# Patient Record
Sex: FEMALE | Race: WHITE | NOT HISPANIC OR LATINO | Employment: OTHER | URBAN - METROPOLITAN AREA
[De-identification: names, ages, dates, MRNs, and addresses within clinical notes are randomized per-mention and may not be internally consistent; named-entity substitution may affect disease eponyms.]

---

## 2023-08-22 ENCOUNTER — TELEPHONE (OUTPATIENT)
Dept: CARDIOLOGY | Facility: MEDICAL CENTER | Age: 63
End: 2023-08-22

## 2023-08-22 ENCOUNTER — TELEPHONE (OUTPATIENT)
Dept: CARDIOLOGY | Facility: MEDICAL CENTER | Age: 63
End: 2023-08-22
Payer: MEDICARE

## 2023-08-22 NOTE — TELEPHONE ENCOUNTER
Spoke to patient in regards to records for NP appointment with Dr. Holt.     Patient has seen a cardiologist before?  Yes   If yes, where?:  thinks if was a Dr Hernandez at the [facility] in [facility] [place]     Any recent cardiac testing outside of [facility]?  Yes Labs    What testing:   Where was it completed?: [facility] in [facility].    Were any records requested?  No   Fax:

## 2023-08-22 NOTE — TELEPHONE ENCOUNTER
Called patient in regards to records for NP appointment with Dr. BENITEZ To review most recent lab results, ekg, any cardiac testing or ,if she has been treated by a cardiologist. No answer, left voicemail to call back

## 2023-08-29 ENCOUNTER — PHARMACY VISIT (OUTPATIENT)
Dept: PHARMACY | Facility: MEDICAL CENTER | Age: 63
End: 2023-08-29
Payer: COMMERCIAL

## 2023-08-29 ENCOUNTER — DEVICE CHECK (OUTPATIENT)
Dept: CARDIOLOGY | Facility: MEDICAL CENTER | Age: 63
End: 2023-08-29
Payer: MEDICARE

## 2023-08-29 ENCOUNTER — OFFICE VISIT (OUTPATIENT)
Dept: CARDIOLOGY | Facility: MEDICAL CENTER | Age: 63
End: 2023-08-29
Attending: INTERNAL MEDICINE
Payer: MEDICARE

## 2023-08-29 VITALS
SYSTOLIC BLOOD PRESSURE: 130 MMHG | OXYGEN SATURATION: 95 % | RESPIRATION RATE: 16 BRPM | BODY MASS INDEX: 42.52 KG/M2 | HEIGHT: 63 IN | HEART RATE: 72 BPM | WEIGHT: 240 LBS | DIASTOLIC BLOOD PRESSURE: 86 MMHG

## 2023-08-29 DIAGNOSIS — E11.59 TYPE 2 DIABETES MELLITUS WITH OTHER CIRCULATORY COMPLICATION, WITHOUT LONG-TERM CURRENT USE OF INSULIN (HCC): ICD-10-CM

## 2023-08-29 DIAGNOSIS — E66.01 MORBID OBESITY (HCC): Chronic | ICD-10-CM

## 2023-08-29 DIAGNOSIS — I48.0 PAROXYSMAL A-FIB (HCC): ICD-10-CM

## 2023-08-29 DIAGNOSIS — G47.33 OSA ON CPAP: Chronic | ICD-10-CM

## 2023-08-29 DIAGNOSIS — I48.19 PERSISTENT ATRIAL FIBRILLATION (HCC): Chronic | ICD-10-CM

## 2023-08-29 PROBLEM — I48.11 LONGSTANDING PERSISTENT ATRIAL FIBRILLATION (HCC): Status: ACTIVE | Noted: 2022-12-09

## 2023-08-29 PROBLEM — Z86.718 HISTORY OF DVT (DEEP VEIN THROMBOSIS): Status: ACTIVE | Noted: 2022-12-09

## 2023-08-29 PROBLEM — F32.0 CURRENT MILD EPISODE OF MAJOR DEPRESSIVE DISORDER WITHOUT PRIOR EPISODE (HCC): Status: ACTIVE | Noted: 2022-12-09

## 2023-08-29 PROBLEM — Z95.0 PACEMAKER: Status: ACTIVE | Noted: 2022-12-09

## 2023-08-29 LAB — EKG IMPRESSION: NORMAL

## 2023-08-29 PROCEDURE — RXMED WILLOW AMBULATORY MEDICATION CHARGE: Performed by: INTERNAL MEDICINE

## 2023-08-29 PROCEDURE — 3075F SYST BP GE 130 - 139MM HG: CPT | Performed by: INTERNAL MEDICINE

## 2023-08-29 PROCEDURE — 99204 OFFICE O/P NEW MOD 45 MIN: CPT | Mod: 25 | Performed by: INTERNAL MEDICINE

## 2023-08-29 PROCEDURE — 3079F DIAST BP 80-89 MM HG: CPT | Performed by: INTERNAL MEDICINE

## 2023-08-29 PROCEDURE — 93005 ELECTROCARDIOGRAM TRACING: CPT | Performed by: INTERNAL MEDICINE

## 2023-08-29 PROCEDURE — 99211 OFF/OP EST MAY X REQ PHY/QHP: CPT | Performed by: INTERNAL MEDICINE

## 2023-08-29 PROCEDURE — 93010 ELECTROCARDIOGRAM REPORT: CPT | Performed by: INTERNAL MEDICINE

## 2023-08-29 RX ORDER — ATORVASTATIN CALCIUM 40 MG/1
TABLET, FILM COATED ORAL
COMMUNITY
Start: 2023-07-11 | End: 2024-01-23 | Stop reason: SDUPTHER

## 2023-08-29 RX ORDER — RIVAROXABAN 20 MG/1
TABLET, FILM COATED ORAL
COMMUNITY
Start: 2023-07-15 | End: 2023-08-29

## 2023-08-29 RX ORDER — EMPAGLIFLOZIN 10 MG/1
10 TABLET, FILM COATED ORAL DAILY
Qty: 90 TABLET | Refills: 3 | Status: SHIPPED | OUTPATIENT
Start: 2023-08-29 | End: 2023-10-23

## 2023-08-29 RX ORDER — DABIGATRAN ETEXILATE 150 MG/1
150 CAPSULE ORAL 2 TIMES DAILY
Qty: 180 CAPSULE | Refills: 3 | Status: SHIPPED | OUTPATIENT
Start: 2023-08-29 | End: 2023-10-17

## 2023-08-29 RX ORDER — GLIMEPIRIDE 2 MG/1
TABLET ORAL
COMMUNITY
Start: 2023-08-04 | End: 2023-11-08

## 2023-08-29 RX ORDER — DESVENLAFAXINE 25 MG/1
TABLET, EXTENDED RELEASE ORAL
COMMUNITY
End: 2023-10-23

## 2023-08-29 ASSESSMENT — FIBROSIS 4 INDEX: FIB4 SCORE: 1.59

## 2023-08-29 NOTE — PATIENT INSTRUCTIONS
You can see me and do pacer checks in [facility] or [place] can also do pacer checks in [place]    Look into [facility]    Pacemaker direct [phone]    [facility] for sleep apnea    I am a [facility] Cardiologist providing cardiology care with [facility] at:    [facility] for those over 65 ([address], [place])     For clinic appointment scheduling, please contact [facility] at  [phone] ([facility]).    For all clinical questions related to your heart care including medications, always contact me at [facility] Cardiology Office in [place] by sending a MyChart or calling [phone].    We have Pacer check in person available in [facility] for those with [employer] or [employer] Pacemakers. Depending on insurance, you can also do in-person at [facility] ([address], [phone]) on Wednesdays with CHARLOTTE Benedict.     Most testing (Labs, Echo, Nuclear Stress Test, Vascular Screening) can be done at [facility] please call [phone] to schedule.    For Cardiac PET, Stress Echo, Cardiac MRI, Cardiac CTA these are only done in [place] please call [facility] Imaging [phone] to schedule.    If you need a procedure such as angiogram, heart stent, pacemaker or ablation my office will contact you for scheduling.      If you need to see me more than once a year, or see us urgently in person, ARMAND Portillo can see you as well.  There are two other Cardiologists who work with me here at [facility] (Dr Shemar Cruz and Dr Mg Jaffe)    [employer] is the easiest way to communicate with my office on your smartphone or computer via the internet.    [facility] Customer Support for [employer], call [phone].     We discussed that you should talk with primary care (or endocrinology) about oral medication: empagliflozin (JARDIANCE®  Preferred for CAD), dapagliflozin (FARXIGA®, preferred for CKD),  there are cardiovascular benefits but  there are also risks.  You may also want to consider liraglutide (Victoza®), semaglutide (Ozempic®), dulaglutide (Trulicity®) which are injection with cardiovascular benefits but also risks.    We discussed adding Jardiance (empagliflozin) 10 mg daily to your regimen to prevent complications from heart disease and help with blood sugar.  Based on large clinical trials Jardiance is expected to reduce your risk by 38% of heart attack or dying from heart disease if you have coronary disease and diabetes (EMPA-REG Study).  In patients without diabetes but who have heart failure, there is a 21-25% relatively less likelihood of dying or being hospitalized for heart failure (EMPEROR Study).  While Jardiance has benefits (most importantly reduce risk for hospitalization for heart failure or heart attack, weight loss, blood pressure control and reduces death) it also has risks due to the fact that you will be urinating excess sugar out of your body. There is increased risk for urinary and yeast infection, which can be very serious, so please report any symptoms as soon as possible to urgent care, primary care or our office.  You should stay well-hydrated on this medication and report abdominal pains.  You are expected to lose weight over time with this medication and may need to reduce your other medications.  It is advised to check your chemistries after starting this medication within the month and then regularly as is typically required for your other medications.

## 2023-08-29 NOTE — PROGRESS NOTES
Device is functioning appropriately.  No changes made today.  Request for transfer of home monitor.  Patient to return in 3 months as device is nearing ROBINA.

## 2023-08-30 ASSESSMENT — ENCOUNTER SYMPTOMS
DIZZINESS: 0
PND: 0
ABDOMINAL PAIN: 0
FOCAL WEAKNESS: 0
MEMORY LOSS: 1
BLURRED VISION: 0
PALPITATIONS: 0
FEVER: 0
FALLS: 1
CLAUDICATION: 0
SORE THROAT: 0
DEPRESSION: 1
COUGH: 0
WEAKNESS: 0
CHILLS: 0
NAUSEA: 0
SHORTNESS OF BREATH: 1
BRUISES/BLEEDS EASILY: 1

## 2023-08-30 NOTE — PROGRESS NOTES
Chief Complaint   Patient presents with    Atrial Fibrillation     NP        Subjective     Bruna Germán Nair is a 62 y.o. female who presents today to establish care for history of A-fib ablations and BiV pacemaker she describes herself as being pacer dependent her records are from [place] she lives in [place] and like to get established at [facility] she will be going on Medicare    She has been doing well tolerating her medications well her Eliquis is expensive    She has new diabetes    She has sleep apnea treated with CPAP    Past Medical History:   Diagnosis Date    EZEQUIEL on CPAP     Persistent atrial fibrillation (HCC) - s/p ablations      Past Surgical History:   Procedure Laterality Date    OTHER      Hysterectomy, appendectomy    IN REPLACE/ REPAIR COMPONENT ARTIFICAL HEART, E*       Family History   Problem Relation Age of Onset    Arrythmia Neg Hx     Heart Disease Neg Hx      Social History     Socioeconomic History    Marital status:      Spouse name: Not on file    Number of children: Not on file    Years of education: Not on file    Highest education level: Not on file   Occupational History    Not on file   Tobacco Use    Smoking status: Never    Smokeless tobacco: Never   Substance and Sexual Activity    Alcohol use: Not on file    Drug use: Not on file    Sexual activity: Not on file   Other Topics Concern    Not on file   Social History Narrative    Not on file     Social Determinants of Health     Financial Resource Strain: Not on file   Food Insecurity: Not on file   Transportation Needs: Not on file   Physical Activity: Not on file   Stress: Not on file   Social Connections: Not on file   Intimate Partner Violence: Not on file   Housing Stability: Not on file     Allergies   Allergen Reactions    Metformin      Severe nausea    Sulfa Drugs Itching    Hydrocodone Nausea     Outpatient Encounter Medications as of 8/29/2023   Medication Sig Dispense Refill    atorvastatin (LIPITOR) 40 MG  "Tab       glimepiride (AMARYL) 2 MG Tab       Desvenlafaxine Succinate ER 25 MG TABLET SR 24 HR Take  by mouth.      Empagliflozin (JARDIANCE) 10 MG Tab tablet Take 1 Tablet by mouth every day. 90 Tablet 3    dabigatran (PRADAXA) 150 MG Cap capsule Take 1 Capsule by mouth 2 times a day. 180 Capsule 3    [DISCONTINUED] XARELTO 20 MG Tab tablet        No facility-administered encounter medications on file as of 8/29/2023.     Review of Systems   Constitutional:  Negative for chills and fever.   HENT:  Negative for sore throat.    Eyes:  Negative for blurred vision.   Respiratory:  Positive for shortness of breath. Negative for cough.    Cardiovascular:  Negative for chest pain, palpitations, claudication, leg swelling and PND.   Gastrointestinal:  Negative for abdominal pain and nausea.   Genitourinary:  Positive for frequency.   Musculoskeletal:  Positive for falls. Negative for joint pain.   Skin:  Negative for rash.   Neurological:  Negative for dizziness, focal weakness and weakness.   Endo/Heme/Allergies:  Positive for environmental allergies. Bruises/bleeds easily.   Psychiatric/Behavioral:  Positive for depression and memory loss.               Objective     /86 (BP Location: Left arm, Patient Position: Sitting, BP Cuff Size: Adult)   Pulse 72   Resp 16   Ht 1.6 m (5' 3\")   Wt 109 kg (240 lb)   SpO2 95%   BMI 42.51 kg/m²     Physical Exam  Constitutional:       General: She is not in acute distress.     Appearance: She is not diaphoretic.   Eyes:      General: No scleral icterus.  Neck:      Vascular: No JVD.   Cardiovascular:      Rate and Rhythm: Normal rate.      Heart sounds: Normal heart sounds. No murmur heard.     No friction rub. No gallop.   Pulmonary:      Effort: No respiratory distress.      Breath sounds: No wheezing or rales.   Abdominal:      General: Bowel sounds are normal.      Palpations: Abdomen is soft.   Musculoskeletal:      Right lower leg: No edema.      Left lower leg: No " edema.   Skin:     Findings: No rash.   Neurological:      Mental Status: She is alert. Mental status is at baseline.   Psychiatric:         Mood and Affect: Mood normal.            We reviewed in person the most recent labs  Recent Results (from the past 5040 hour(s))   POCT A1C    Collection Time: 07/11/23  1:48 PM   Result Value Ref Range    Glycohemoglobin 8.8 (A) 4.2 - 5.8 %    Est Avg Glucose      Glycohemoglobin     MICROALBUMIN CREAT RATIO URINE    Collection Time: 07/19/23  8:44 AM   Result Value Ref Range    Microalbumin Urine Timed <9.2 0.0 - 30.0 mg/g    Microalbumin, Urine Random <0.7 <=1.9 mg/dL    Creatinine, Random Urine 76 Not Established mg/dL   COMP METABOLIC PANEL    Collection Time: 07/19/23  8:44 AM   Result Value Ref Range    Sodium 138 136 - 144 mmol/L    Potassium 4.5 3.6 - 5.1 mmol/L    Chloride 106 102 - 110 mmol/L    Co2 26 22 - 32 mmol/L    Alkaline Phosphatase 70 38 - 126 U/L    AST(SGOT) 76 (H) 15 - 41 U/L    ALT(SGPT) 112 (H) 14 - 54 U/L    Bun 20 8 - 20 mg/dL    Creatinine 0.75 0.60 - 1.10 mg/dL    Calcium 9.2 8.7 - 10.3 mg/dL    Total Protein 7.4 6.4 - 8.2 g/dL    Albumin 4.2 3.5 - 4.8 g/dL    Ag Ratio 1.3 1.0 - 2.2 ratio    Anion Gap 6 2 - 11 mmol/L    Glom Filt Rate, Est 78 >60 mL/min/1.7    Globulin 3.2 (H) 2.6 - 3.1 g/dL    Glucose 174 (H) 60 - 99 mg/dL    Total Bilirubin 0.7 0.4 - 2.0 mg/dL   HEPATITIS PANEL ACUTE (4 COMPONENTS)    Collection Time: 07/19/23  8:44 AM   Result Value Ref Range    Hep A Virus Antibody Total Reactive (A) Nonreactive    Hepatitis A Virus Ab, IgM Nonreactive Nonreactive    Hepatitis B Core Ab, Total Nonreactive Nonreactive    Hepatitis B Cors Ab,IgM Nonreactive Nonreactive    Hep B Surgace Ab, Qual Nonreactive     Hepatitis B Surface Antigen Nonreactive Nonreactive    Hepatitis C virus IgG Ab (Units/volume) in Serum by Immunoassay External Nonreactive Nonreactive   EKG    Collection Time: 08/29/23 10:58 AM   Result Value Ref Range    Report        [facility] [place] [facility]    Test Date:  2023  Pt Name:    TRINY NAIR                  Department: [facility]  MRN:        [id number]                      Room:  Gender:     Female                       Technician: OKSANA  :        1960                   Requested By:PIERCE FABIAN  Order #:    [id number]                    Reading MD: Pierce Fabian MD    Measurements  Intervals                                Axis  Rate:       80                           P:          50  NY:         162                          QRS:        -72  QRSD:       129                          T:          56  QT:         411  QTc:        475    Interpretive Statements  Atrial-BIventricular dual-paced rhythm  No previous ECG available for comparison  Electronically Signed On 2023 17:32:18 PDT by Pierce Fabian MD           Assessment & Plan     1. Paroxysmal A-fib (HCC)  EKG      2. Morbid obesity (HCC)        3. EZEQUIEL on CPAP  Referral to Pulmonary and Sleep Medicine      4. Persistent atrial fibrillation (HCC) - s/p ablations  dabigatran (PRADAXA) 150 MG Cap capsule    EC-ECHOCARDIOGRAM COMPLETE W/O CONT      5. Type 2 diabetes mellitus with other circulatory complication, without long-term current use of insulin (HCC)  Empagliflozin (JARDIANCE) 10 MG Tab tablet          Medical Decision Making: Today's Assessment/Status/Plan:        It was my pleasure to meet with Ms. Nair.    We addressed the management of hypertension at today's visit. Blood pressure is well controlled.  We specifically assessed the labs on hypertension treatment    We addressed the management of dyslipidemia and atherosclerosis at today's visit. She is on appropriate statin.    We addressed the management of atrial fibrillation.  She is on proper anticoagulation cholesterol management and rate or rhythm control as appropriate.  We addressed the potential side effects and laboratory follow-up for these medications.  We will switch her to  Pradaxa for cost savings    We addressed the management of chronic anticoagulation at today's visit. She understands the risks and benefits of chronic anticoagulation.  We reviewed and verified the results of annual testing for anemia and kidney function.    For diabetes she would benefit from Jardiance    I personally reviewed in person with the patient her most recent pacemaker check it is functioning appropriately.     8/29/2023   [facility] DEVICE FLOWSHEET    Device History: Third Degree AV Block;Congestive Heart Failure    Device Type: C R T - P    Mode: D D D R    Rate: 60    Presenting Rhythm: ApVp    Atrially Paced: (%) 33 %    Ventricularly Paced: (%) 100 %    Device Therapy Details: No therapies--2 NST episodes    Mode Switching Details: Mode switch @ 0.2%--possible TENZIN noted on some EGM    Atrial Lead Threshold: (Volts) 0.75 Volts    Atrial Lead Sensing: (mV) 4.4 mV    Atrial Lead Impedance: (Ohms) 399 Ohms    Right Ventricular Lead Threshold: (Volts) 1.75 Volts    Right Ventricular Lead Impedance: (Ohms) 475 Ohms    Left Ventricular Lead Threshold: (Volts) 1 Volts    Left Ventricular Lead Impedance: (Ohms) 608 Ohms    Battery Estimated Longevity: 10-19 Months    Changes Made: No changes made          I will see Ms. Nair back in 1 year time and encouraged her to follow up with us over the phone or electronically using my MyChart as issues arise.    It is my pleasure to participate in the care of Ms. Nair.  Please do not hesitate to contact me with questions or concerns.    Pierce Holt MD PhD [facility]  Cardiologist [facility]    Please note that this dictation was created using voice recognition software. There may be errors I did not discover before finalizing the note.

## 2023-10-06 ENCOUNTER — TELEPHONE (OUTPATIENT)
Dept: PHARMACY | Facility: MEDICAL CENTER | Age: 63
End: 2023-10-06
Payer: MEDICARE

## 2023-10-11 ENCOUNTER — TELEPHONE (OUTPATIENT)
Dept: CARDIOLOGY | Facility: MEDICAL CENTER | Age: 63
End: 2023-10-11
Payer: MEDICARE

## 2023-10-11 NOTE — TELEPHONE ENCOUNTER
Last OV: 8/29/2023  Proposed Surgery: Colonoscopy with Deep (Propofol) Sedation  Surgery Date: 10/18/2023  Requesting Office Name: [facility]  Fax Number: [phone]  Preference of Location (default is surgery center unless specified by Cardiologist or [facility])  Prior Clearance Addressed: No      Anticoags/Antiplatelets: Pradaxa  Outstanding Cardiac Imaging : Yes  Echo.   Clearance to provider to review  Stent, Cardiac Devices, or Catheterization: Yes  Date : 5/16/2017   Ablation, TAVR/Valve (including open heart), Cardioversion: No  Recent Cardiac Hospitalization: No            When: N/A  History (cardiac history):   Past Medical History:   Diagnosis Date    EZEQUIEL on CPAP     Persistent atrial fibrillation (HCC) - s/p ablations              Surgical Clearance Letter Sent: YES   **Scan clearance request letter into [facility].**

## 2023-10-11 NOTE — LETTER
PROCEDURE/SURGERY CLEARANCE FORM      Encounter Date: 10/11/2023    Patient: Bruna Nair  YOB: 1960    CARDIOLOGIST:  Pierce Holt M.D.    REFERRING DOCTOR:  No ref. provider found    The above patient is cleared to have the following procedure/surgery: Colonoscopy with Deep (Propofol) Sedation  PROCEDURE/SURGERY CLEARANCE FORM    Date: 10/11/2023   Patient Name: Bruna Nair    Dear Surgeon or Proceduralist,      Thank you for your request for cardiac stratification of our mutual patient Bruna Nair 1960. We have reviewed their [facility] records; and to the best of our understanding this patient has not had stenting, ablation, cardiothoracic surgery or hospitalization for cardiovascular reasons in the past 6 months.  Bruna Nair has been seen within the past 18 months and is considered to have non-modifiable cardiac risk for this low-risk procedure/surgery. They may proceed from a cardiovascular standpoint and may hold their antiplatelet/anticoagulation as briefly as possible. Please have patient resume this medication when hemodynamically stable to do so.     Aspirin or Prasugrel   - hold 7 days prior to procedure/surgery, resume when hemodynamically stable      Clopidrogrel or Ticagrelor  - hold 7 days for all neurological procedures, hold 5 days prior to all other procedure/surgery,  resume when hemodynamically stable     Warfarin - hold 7 days for all neurological procedures, hold 5 days prior to all other procedure/surgery and coordinate with [facility] ([phone]) INR testing and dose management.      Pradaxa/Xarelto/Eliquis/Savesya - hold 1 day prior to procedure for low bleeding risk procedure, 2 days for high bleeding risk procedure, or consider holding 3 days or longer for patients with reduced kidney function (CrCl <30mL/min) or spinal/cranial surgeries/procedures.      If they have a mechanical heart valve, please coordinate with  [facility] Anticoagulation [facility] ([phone]) the proper management of their anticoagulant in the periprocedural or perioperative period.      Some patients have higher risk for cardiovascular complications or holding medication. If our patient has had prior complications of holding antiplatelet or anticoagulants in the past and we have seen them after these events, we have addressed these concerns with the patient. They are at an unknown degree of increased risk for recurrent complication.  You may hold anticoagulation/antiplatelets for the procedure or surgery if the benefits of the procedure or surgery outweigh this nonmodifiable risk.      If Bruna Nair 1960 has new symptoms of heart failure decompensation, unstable arrythmia, or angina please reach out and we will assess the patient.      If you have other patient-specific concerns, please feel free to reach out to the patient's cardiologist directly at [phone].     Thank you,       [facility]     Electronically Signed        MD Fatimah Holt M.D.

## 2023-10-16 ENCOUNTER — TELEPHONE (OUTPATIENT)
Dept: CARDIOLOGY | Facility: MEDICAL CENTER | Age: 63
End: 2023-10-16
Payer: MEDICARE

## 2023-10-16 DIAGNOSIS — I48.0 PAROXYSMAL A-FIB (HCC): ICD-10-CM

## 2023-10-16 NOTE — TELEPHONE ENCOUNTER
Phone number called: [phone]    Call outcome: Spoke to patient and she stated she had the episode of GI bleeding as described below, and she reached out to her old primary care physician (her new one is on leave) and they switched her back to Xarelto from Pradaxa. Patient would like to stay on Xarelto at this time. She hasn't had any reoccurrence of bleeding since the initial one. Advised patient to reach out to her GI physician as well, as she is in process of scheduling a colonoscopy.     To CW: Patient was switched back to Xarelto by PCP after GI bleeding episode. Patient requesting to stay on Xarelto rather than Pradaxa, and is asking for a new prescription be sent. Please advise if okay to send prescription for prior Xarelto dose or if other recommendations. Thank you!

## 2023-10-16 NOTE — TELEPHONE ENCOUNTER
----- Message from Sydni Mirza R.N. sent at 10/16/2023  9:20 AM PDT -----  Regarding: FW: Pradaxa    ----- Message -----  From: Lorie Anderson  Sent: 10/13/2023  12:37 PM PDT  To: Shalini Guerra R.N.  Subject: Pradaxa                                          Cameron Loya,     This patient had a rectum bleeding episode today and is requesting to talk with you (or Dr. Holt) regarding her new start of Pradaxa. She was taking Xarelto for years, however, she was changed to Pradaxa and started taking it 2 weeks ago. She had a bowel movement yesterday (10/12) with no bleeding, however this morning she had rectum bleeding for about 10 minutes without a bowel movement. She stated that it was not clumps, just red blood. She reached out to her old primary care physician when she could not get ahold of Dr. Holt and he wrote her a new Rx for Xarelto because she is afraid that it is from the Pradaxa but does not want to go without a blood thinner all weekend. She would like to talk with you in regards to the bleeding and how to proceed from here.... Also if there is any process of transitioning back to Xarelto from Pradaxa if needed (like time in between tabs ect).     Can you please reach out to patient ASAP?    Thank you very much!    Lorie DENNIS  Rx Coordinator

## 2023-10-17 NOTE — TELEPHONE ENCOUNTER
Phone number called: [phone]    Call outcome: Spoke with patient and provided CW's recommendations. Patient would like to stay on Xarelto since she has tolerated it a long time prior to trying to switch. She had already restarted the Xarelto and discontinued the Pradaxa after speaking with her old PCP. Medication list updated and refill sent to her preferred pharmacy. Patient states she is following up with her GI doctor as well. All questions/concerns answered at this time, patient appreciative of information given.

## 2023-10-17 NOTE — TELEPHONE ENCOUNTER
Unfortunately GI bleeding can be a higher risk with Pradaxa.  The preferred blood thinner will be Eliquis 5 mg twice a day but Xarelto is a very reasonable option.  Certainly monitor for blood and see her gastroenterologist for standard screening if she has not done that recently.  She just has to stop Pradaxa what day and start Xarelto (or Eliquis) the next day.

## 2023-10-19 ENCOUNTER — TELEPHONE (OUTPATIENT)
Dept: PHARMACY | Facility: MEDICAL CENTER | Age: 63
End: 2023-10-19
Payer: MEDICARE

## 2023-10-19 NOTE — TELEPHONE ENCOUNTER
Medication: Jardiance 10mg tabs  Type of Insurance: Government funded (Medicare/Medicare Advantage)  Type of Financial assistance requested MAP/Free Drug  Source: [facility]  Source Phone #: [phone]  Outcome: Approved  Effective dates: 10/13/2023 until 12/31/2024    Final Copay: $0    Spoke with Deana ([facility] rep) on 10/19/23 to check status on application. She confirmed application was approved and shipment is ready to be sent out today, 10/19/23, and will take 7-10 business days to arrive to patient. Deana stated that each time the patient receives their medication, there will be a green card in the box for the patient to fill out and send back to [facility] to initiate the auto fill for their next refill. Calling patient to advise of Jardiance PAP approval.

## 2023-10-20 ENCOUNTER — TELEPHONE (OUTPATIENT)
Dept: CARDIOLOGY | Facility: MEDICAL CENTER | Age: 63
End: 2023-10-20
Payer: MEDICARE

## 2023-10-20 DIAGNOSIS — R06.02 SHORTNESS OF BREATH: ICD-10-CM

## 2023-10-20 DIAGNOSIS — I48.0 PAROXYSMAL A-FIB (HCC): ICD-10-CM

## 2023-10-20 DIAGNOSIS — Z79.899 ON POTASSIUM WASTING DIURETIC THERAPY: ICD-10-CM

## 2023-10-20 RX ORDER — FUROSEMIDE 20 MG/1
20 TABLET ORAL 2 TIMES DAILY
Qty: 8 TABLET | Refills: 0 | Status: SHIPPED | OUTPATIENT
Start: 2023-10-20 | End: 2023-10-23 | Stop reason: SDUPTHER

## 2023-10-20 RX ORDER — POTASSIUM CHLORIDE 20 MEQ/1
20 TABLET, EXTENDED RELEASE ORAL 2 TIMES DAILY
Qty: 8 TABLET | Refills: 0 | Status: SHIPPED | OUTPATIENT
Start: 2023-10-20 | End: 2023-10-23 | Stop reason: SDUPTHER

## 2023-10-20 NOTE — TELEPHONE ENCOUNTER
CW    Caller: Bruna Nair     Topic/issue: Pt would like an ECHO order placed at the [facility], please advise.     Callback Number: [phone] (home)       Thank you,     Robert CHRISTIANSEN

## 2023-10-20 NOTE — TELEPHONE ENCOUNTER
CW    Caller: Bruna Nair    Topic/issue: Patient calling and states that she was seen 10-19-23 at [facility] with symptoms of being short of breath. Told by ER doctor she is in AFIB and given lasix due to fluid around chest. I was able to get patient an appointment on 10/24/23 but she is concerned about running out of medications on  and asking for refill for the followin) Lasix 20 mg taking one twice a day  2) Potasium 20 mg taking one tab by mouth twice a day    Pharmacy: [facility] in [place] on file.    Callback Number: [phone] (home)      Thank you,  Allyn GOMEZ

## 2023-10-23 ENCOUNTER — OFFICE VISIT (OUTPATIENT)
Dept: CARDIOLOGY | Facility: MEDICAL CENTER | Age: 63
End: 2023-10-23
Attending: NURSE PRACTITIONER
Payer: MEDICARE

## 2023-10-23 VITALS
RESPIRATION RATE: 16 BRPM | BODY MASS INDEX: 41.46 KG/M2 | DIASTOLIC BLOOD PRESSURE: 72 MMHG | WEIGHT: 234 LBS | HEART RATE: 80 BPM | HEIGHT: 63 IN | SYSTOLIC BLOOD PRESSURE: 110 MMHG | OXYGEN SATURATION: 94 %

## 2023-10-23 VITALS
OXYGEN SATURATION: 94 % | HEIGHT: 63 IN | SYSTOLIC BLOOD PRESSURE: 110 MMHG | HEART RATE: 80 BPM | BODY MASS INDEX: 41.46 KG/M2 | RESPIRATION RATE: 16 BRPM | WEIGHT: 234 LBS | DIASTOLIC BLOOD PRESSURE: 72 MMHG

## 2023-10-23 DIAGNOSIS — R06.02 SHORTNESS OF BREATH: ICD-10-CM

## 2023-10-23 DIAGNOSIS — Z79.899 ON POTASSIUM WASTING DIURETIC THERAPY: ICD-10-CM

## 2023-10-23 DIAGNOSIS — D68.318 CIRCULATING ANTICOAGULANTS (HCC): ICD-10-CM

## 2023-10-23 DIAGNOSIS — I48.0 PAROXYSMAL A-FIB (HCC): ICD-10-CM

## 2023-10-23 DIAGNOSIS — I48.19 PERSISTENT ATRIAL FIBRILLATION (HCC): Chronic | ICD-10-CM

## 2023-10-23 DIAGNOSIS — E11.9 TYPE 2 DIABETES MELLITUS WITHOUT COMPLICATION, WITHOUT LONG-TERM CURRENT USE OF INSULIN (HCC): ICD-10-CM

## 2023-10-23 DIAGNOSIS — I10 ESSENTIAL HYPERTENSION, BENIGN: ICD-10-CM

## 2023-10-23 DIAGNOSIS — I44.2 AV BLOCK, COMPLETE (HCC): ICD-10-CM

## 2023-10-23 DIAGNOSIS — Z95.0 PACEMAKER: ICD-10-CM

## 2023-10-23 PROCEDURE — 3078F DIAST BP <80 MM HG: CPT | Performed by: NURSE PRACTITIONER

## 2023-10-23 PROCEDURE — 93288 INTERROG EVL PM/LDLS PM IP: CPT | Performed by: NURSE PRACTITIONER

## 2023-10-23 PROCEDURE — 99214 OFFICE O/P EST MOD 30 MIN: CPT | Performed by: NURSE PRACTITIONER

## 2023-10-23 PROCEDURE — 93288 INTERROG EVL PM/LDLS PM IP: CPT | Mod: 26 | Performed by: NURSE PRACTITIONER

## 2023-10-23 PROCEDURE — 3074F SYST BP LT 130 MM HG: CPT | Performed by: NURSE PRACTITIONER

## 2023-10-23 PROCEDURE — 99212 OFFICE O/P EST SF 10 MIN: CPT | Performed by: NURSE PRACTITIONER

## 2023-10-23 RX ORDER — POTASSIUM CHLORIDE 20 MEQ/1
20 TABLET, EXTENDED RELEASE ORAL 2 TIMES DAILY
Qty: 60 TABLET | Refills: 6 | Status: SHIPPED | OUTPATIENT
Start: 2023-10-23

## 2023-10-23 RX ORDER — VITAMIN B COMPLEX
1000 TABLET ORAL DAILY
COMMUNITY

## 2023-10-23 RX ORDER — FUROSEMIDE 20 MG/1
20 TABLET ORAL 2 TIMES DAILY
Qty: 60 TABLET | Refills: 6 | Status: SHIPPED | OUTPATIENT
Start: 2023-10-23 | End: 2023-11-08

## 2023-10-23 ASSESSMENT — ENCOUNTER SYMPTOMS
SHORTNESS OF BREATH: 1
FEVER: 0
BRUISES/BLEEDS EASILY: 0
NERVOUS/ANXIOUS: 1
LOSS OF CONSCIOUSNESS: 0
DIZZINESS: 0
COUGH: 0
HEADACHES: 0
ABDOMINAL PAIN: 0
NAUSEA: 0
ORTHOPNEA: 0
PND: 0
MYALGIAS: 0
CHILLS: 0
INSOMNIA: 0
PALPITATIONS: 0

## 2023-10-23 ASSESSMENT — FIBROSIS 4 INDEX
FIB4 SCORE: 1.18
FIB4 SCORE: 1.18

## 2023-10-23 NOTE — PROGRESS NOTES
Chief Complaint   Patient presents with    Hospital Follow-up     ER visit x 2 for shortness of breath    Shortness of Breath           Biventricular PM    Atrial Fibrillation    Anticoagulation       Subjective     Bruna Nair is a 63 y.o. female who presents today for ER follow-up of shortness of breath.    Bruna is a 63 year old female with history of high AV block with PM since 2007, upgraded to CRT-P in 2017, PAFib, status post previous ablation(s), anticoagulation with Xarelto, HTN, hyperlipidemia, DM and EZEQUIEL (on CPAP), first seen by Dr. Holt in August 2023, to establish care, as she was new to the area.    Last week, she presented to [facility] ER on 10/19/2023 with shortness of breath. CXR was normal, and BNP, D-dimer and troponins were all normal. She was treated with IV Lasix; she had minimal response.    Three days later, on 10/22/2023, she presented back at [facility] ER with shortness of breath; all labs and CXR came back normal. PM interrogation was also normal. She was sent him on Lasix 20mg BID, and KCL 20mEq BID.    She is here today for follow-up. Mostly, she is tired, and scared, as she still feels short of breath. She tried taking Lasix 40mg later in the afternoon (versus 20mg BID), and she did sleep better.    She denies any chest pain, pressure, tightness or discomfort; no symptomatic palpitations; some mild orthopnea but no PND; no dizziness or syncope; really minimal LE edema. BP has been stable.     She see pulmonology tomorrow (Dr. Drummond) to titrate her CPAP; it has not been adjusted in 5+ years.    Past Medical History:   Diagnosis Date    Anticoagulated     AV block, complete (HCC)     Diabetes mellitus (HCC)     Hyperlipidemia     Hypertension     EZEQUIEL on CPAP     Persistent atrial fibrillation (HCC) - s/p ablations      Past Surgical History:   Procedure Laterality Date    PACEMAKER INSERTION Left 05/16/2017    Upgrade to Medtronic Viva CRT-P C6TR01; original PM implanted in 2007.     APPENDECTOMY      HYSTERECTOMY, TOTAL ABDOMINAL       Family History   Problem Relation Age of Onset    Arrythmia Neg Hx     Heart Disease Neg Hx      Social History     Socioeconomic History    Marital status:      Spouse name: Not on file    Number of children: Not on file    Years of education: Not on file    Highest education level: Not on file   Occupational History    Not on file   Tobacco Use    Smoking status: Never    Smokeless tobacco: Never   Vaping Use    Vaping Use: Not on file   Substance and Sexual Activity    Alcohol use: Not Currently    Drug use: Not on file    Sexual activity: Not on file   Other Topics Concern    Not on file   Social History Narrative    Not on file     Social Determinants of Health     Financial Resource Strain: Not on file   Food Insecurity: Not on file   Transportation Needs: Not on file   Physical Activity: Not on file   Stress: Not on file   Social Connections: Not on file   Intimate Partner Violence: Not on file   Housing Stability: Not on file     Allergies   Allergen Reactions    Metformin      Severe nausea    Sulfa Drugs Itching    Hydrocodone Nausea     Outpatient Encounter Medications as of 10/23/2023   Medication Sig Dispense Refill    vitamin D3 (CHOLECALCIFEROL) 1000 Unit (25 mcg) Tab Take 1,000 Units by mouth every day.      Prenatal Vit-DSS-Fe Cbn-FA (PRENATAL AD PO) Take  by mouth.      furosemide (LASIX) 20 MG Tab Take 1 Tablet by mouth 2 times a day. 60 Tablet 6    potassium chloride SA (KDUR) 20 MEQ Tab CR Take 1 Tablet by mouth 2 times a day. 60 Tablet 6    rivaroxaban (XARELTO) 20 MG Tab tablet Take 1 Tablet by mouth with dinner. 90 Tablet 3    atorvastatin (LIPITOR) 40 MG Tab       glimepiride (AMARYL) 2 MG Tab       [DISCONTINUED] furosemide (LASIX) 20 MG Tab Take 1 Tablet by mouth 2 times a day. Must keep follow up visit on 10/24/23 to ensure further refills. 8 Tablet 0    [DISCONTINUED] potassium chloride SA (KDUR) 20 MEQ Tab CR Take 1 Tablet by  "mouth 2 times a day. Must keep follow up visit on 10/24/23 to ensure further refills. 8 Tablet 0    [DISCONTINUED] Desvenlafaxine Succinate ER 25 MG TABLET SR 24 HR Take  by mouth. (Patient not taking: Reported on 10/23/2023)      [DISCONTINUED] Empagliflozin (JARDIANCE) 10 MG Tab tablet Take 1 Tablet by mouth every day. (Patient not taking: Reported on 10/23/2023) 90 Tablet 3     No facility-administered encounter medications on file as of 10/23/2023.     Review of Systems   Constitutional:  Positive for malaise/fatigue. Negative for chills and fever.   HENT:  Negative for congestion.    Respiratory:  Positive for shortness of breath. Negative for cough.    Cardiovascular:  Negative for chest pain, palpitations, orthopnea, leg swelling and PND.   Gastrointestinal:  Negative for abdominal pain and nausea.   Musculoskeletal:  Negative for myalgias.   Skin:  Negative for rash.   Neurological:  Negative for dizziness, loss of consciousness and headaches.   Endo/Heme/Allergies:  Does not bruise/bleed easily.   Psychiatric/Behavioral:  The patient is nervous/anxious. The patient does not have insomnia.               Objective     /72 (BP Location: Left arm, Patient Position: Sitting, BP Cuff Size: Adult)   Pulse 80   Resp 16   Ht 1.6 m (5' 3\")   Wt 106 kg (234 lb)   SpO2 94%   BMI 41.45 kg/m²     Physical Exam  Constitutional:       Appearance: She is well-developed.      Comments: BMI 41.45   HENT:      Head: Normocephalic.   Neck:      Vascular: No JVD.   Cardiovascular:      Rate and Rhythm: Normal rate and regular rhythm.      Heart sounds: Normal heart sounds.      Comments: PM in left chest wall.  Pulmonary:      Effort: Pulmonary effort is normal. No respiratory distress.      Breath sounds: Normal breath sounds. No wheezing or rales.   Abdominal:      General: Bowel sounds are normal. There is no distension.      Palpations: Abdomen is soft.      Tenderness: There is no abdominal tenderness. "   Musculoskeletal:         General: Normal range of motion.      Cervical back: Normal range of motion and neck supple.   Skin:     General: Skin is warm and dry.      Findings: No rash.   Neurological:      Mental Status: She is alert and oriented to person, place, and time.   Psychiatric:         Mood and Affect: Mood normal.         Behavior: Behavior normal.     PM interrogation today reveals normal function. NO mode switching episodes in the last 24 hours. No changes are made today.    Impression of CXR of 10/22/2023 ([facility]):  1.  No acute cardiopulmonary finding.     Impression of CXR of 10/19/2023 ([facility]):  1.  No acute cardiopulmonary finding.     B-Type Natriuretic Peptide   <=100 pg/mL 10     D-DIMER   <0.50 ug/mL <0.27      Troponin-I, HS Baseline   <14 ng/L 5      Component      Latest Ref Rng 10/22/2023   Leukocytes, Absolute      3.7 - 10.6 x10E3/uL 8.5 (E)   RBC      4.19 - 5.21 x10e6/uL 5.10 (E)   Hemoglobin      12.3 - 16.0 g/dL 15.0 (E)   Hematocrit      36.0 - 47.0 % 44.2 (E)   MCV      81.0 - 99.0 fL 86.5 (E)   MCH      28.0 - 33.8 pg 29.5 (E)   MCHC      33.1 - 36.5 g/dL 34.0 (E)   RDW      11.8 - 14.0 % 13.1 (E)   Platelet Count      146 - 390 x10E3/uL 279 (E)   MPV      6.4 - 10.2 fL 8.8 (E)   Neutrophils-Polys      41.7 - 82.3 % 63.9 (E)   Lymphocytes      10.8 - 44.4 % 24.5 (E)   Monocytes      5.0 - 12.8 % 7.8 (E)   Eosinophils      0.0 - 6.6 % 2.6 (E)   Basophils      0.0 - 1.3 % 1.2 (E)   Neutrophils (Absolute)      1.40 - 8.00 x10E3/uL 5.40 (E)   Lymphs (Absolute)      1.00 - 5.20 x10E3/uL 2.10 (E)   Monos (Absolute)      0.16 - 1.00 x10E3/uL 0.70 (E)   Eos (Absolute)      0.00 - 0.80 x10E3/uL 0.20 (E)   Baso (Absolute)      0.00 - 0.30 x10E3/uL 0.10 (E)      Component      Latest Ref Rng 10/22/2023   Sodium      136 - 144 mmol/L 138 (E)   Potassium      3.6 - 5.1 mmol/L 3.8 (E)   Chloride      102 - 110 mmol/L 103 (E)   Co2      22 - 32 mmol/L 25 (E)   Alkaline Phosphatase      38 - 126  U/L 79 (E)   AST(SGOT)      15 - 41 U/L 42 (H) (E)   ALT(SGPT)      14 - 54 U/L 69 (H) (E)   Bun      8 - 20 mg/dL 25 (H) (E)   Creatinine      0.60 - 1.10 mg/dL 0.91 (E)   Calcium      8.7 - 10.3 mg/dL 10.4 (H) (E)   Total Protein      6.4 - 8.2 g/dL 8.1 (E)   Albumin      3.5 - 4.8 g/dL 4.5 (E)   Ag Ratio      1.0 - 2.2 ratio 1.3 (E)   Anion Gap      2 - 11 mmol/L 10 (E)   Glom Filt Rate, Est      >60 mL/min/1.7 62 (E)   Globulin      2.6 - 3.1 g/dL 3.6 (H) (E)   Glucose      60 - 99 mg/dL 162 (H) (E)   Total Bilirubin      0.4 - 2.0 mg/dL 0.8 (E)          Assessment & Plan     1. Shortness of breath  EC-ECHOCARDIOGRAM COMPLETE W/O CONT    furosemide (LASIX) 20 MG Tab      2. Pacemaker - Medtronic BIV- P        3. Persistent atrial fibrillation (HCC)  EC-ECHOCARDIOGRAM COMPLETE W/O CONT      4. Paroxysmal A-fib (HCC)  furosemide (LASIX) 20 MG Tab      5. AV block, complete (HCC)        6. Circulating anticoagulants (HCC)        7. On potassium wasting diuretic therapy  potassium chloride SA (KDUR) 20 MEQ Tab CR      8. Essential hypertension, benign        9. Type 2 diabetes mellitus without complication, without long-term current use of insulin (HCC)            Medical Decision Making: Today's Assessment/Status/Plan:      1. Shortness of breath, possibly due to suboptimally treated EZEQUIEL. She does see pulmonology tomorrow for CPAP adjustment. She may need additional oxygen QHS. We will obtain STAT echocardiogram. For now, take Lasix 60mg and KCl 40mEq for the next 3 days, and then back to Lasix 40mg and KCl 40mEq daily, until we have results of echo.    2. History of high AV block, with PAFib, with CRT-D, which is working normally. No changes are made today.    3. Chronic anticoagulation with Xarelto. She was previously on Pradaxa, but has some rectal bleeding on this; she is now back on Xarelto. She does have an appointment with GI for colonoscopy.    4. Hypertension, not currently on any meds. BP is stable.    5.  Hyperlipidemia, treated with Lipitor 40mg.    6. Diabetes mellitus, currently on Amaryl 2mg once daily.    As above, we will obtain STAT echocardiogram. Increase Lasix for the next 3 days. We will make changes based on echo results. She does have follow-up with me in January 2024 as well. Keep follow-up with other providers too.

## 2023-10-23 NOTE — LETTER
[facility]   [address]   [address]  [place] [address]  Phone: [phone]  Fax: [phone]              Bruna Nair  1960    Encounter Date: 10/23/2023    HERACLIO Benedict          PROGRESS NOTE:  Chief Complaint   Patient presents with   • Hospital Follow-up     ER visit x 2 for shortness of breath   • Shortness of Breath          • Biventricular PM   • Atrial Fibrillation   • Anticoagulation       Subjective    Bruna Nair is a 63 y.o. female who presents today for ER follow-up of shortness of breath.    Bruna is a 63 year old female with history of high AV block with PM since 2007, upgraded to CRT-P in 2017, PAFib, status post previous ablation(s), anticoagulation with Xarelto, HTN, hyperlipidemia, DM and EZEQUIEL (on CPAP), first seen by Dr. Holt in August 2023, to establish care, as she was new to the area.    Last week, she presented to [facility] ER on 10/19/2023 with shortness of breath. CXR was normal, and BNP, D-dimer and troponins were all normal. She was treated with IV Lasix; she had minimal response.    Three days later, on 10/22/2023, she presented back at [facility] ER with shortness of breath; all labs and CXR came back normal. PM interrogation was also normal. She was sent him on Lasix 20mg BID, and KCL 20mEq BID.    She is here today for follow-up. Mostly, she is tired, and scared, as she still feels short of breath. She tried taking Lasix 40mg later in the afternoon (versus 20mg BID), and she did sleep better.    She denies any chest pain, pressure, tightness or discomfort; no symptomatic palpitations; some mild orthopnea but no PND; no dizziness or syncope; really minimal LE edema. BP has been stable.     She see pulmonology tomorrow (Dr. Drummond) to titrate her CPAP; it has not been adjusted in 5+ years.    Past Medical History:   Diagnosis Date   • Anticoagulated    • AV  block, complete (HCC)    • Diabetes mellitus (HCC)    • Hyperlipidemia    • Hypertension    • EZEQUIEL on CPAP    • Persistent atrial fibrillation (HCC) - s/p ablations      Past Surgical History:   Procedure Laterality Date   • PACEMAKER INSERTION Left 05/16/2017    Upgrade to Medtronic Viva CRT-P C6TR01; original PM implanted in 2007.   • APPENDECTOMY     • HYSTERECTOMY, TOTAL ABDOMINAL       Family History   Problem Relation Age of Onset   • Arrythmia Neg Hx    • Heart Disease Neg Hx      Social History     Socioeconomic History   • Marital status:      Spouse name: Not on file   • Number of children: Not on file   • Years of education: Not on file   • Highest education level: Not on file   Occupational History   • Not on file   Tobacco Use   • Smoking status: Never   • Smokeless tobacco: Never   Vaping Use   • Vaping Use: Not on file   Substance and Sexual Activity   • Alcohol use: Not Currently   • Drug use: Not on file   • Sexual activity: Not on file   Other Topics Concern   • Not on file   Social History Narrative   • Not on file     Social Determinants of Health     Financial Resource Strain: Not on file   Food Insecurity: Not on file   Transportation Needs: Not on file   Physical Activity: Not on file   Stress: Not on file   Social Connections: Not on file   Intimate Partner Violence: Not on file   Housing Stability: Not on file     Allergies   Allergen Reactions   • Metformin      Severe nausea   • Sulfa Drugs Itching   • Hydrocodone Nausea     Outpatient Encounter Medications as of 10/23/2023   Medication Sig Dispense Refill   • vitamin D3 (CHOLECALCIFEROL) 1000 Unit (25 mcg) Tab Take 1,000 Units by mouth every day.     • Prenatal Vit-DSS-Fe Cbn-FA (PRENATAL AD PO) Take  by mouth.     • furosemide (LASIX) 20 MG Tab Take 1 Tablet by mouth 2 times a day. 60 Tablet 6   • potassium chloride SA (KDUR) 20 MEQ Tab CR Take 1 Tablet by mouth 2 times a day. 60 Tablet 6   • rivaroxaban (XARELTO) 20 MG Tab tablet  "Take 1 Tablet by mouth with dinner. 90 Tablet 3   • atorvastatin (LIPITOR) 40 MG Tab      • glimepiride (AMARYL) 2 MG Tab      • [DISCONTINUED] furosemide (LASIX) 20 MG Tab Take 1 Tablet by mouth 2 times a day. Must keep follow up visit on 10/24/23 to ensure further refills. 8 Tablet 0   • [DISCONTINUED] potassium chloride SA (KDUR) 20 MEQ Tab CR Take 1 Tablet by mouth 2 times a day. Must keep follow up visit on 10/24/23 to ensure further refills. 8 Tablet 0   • [DISCONTINUED] Desvenlafaxine Succinate ER 25 MG TABLET SR 24 HR Take  by mouth. (Patient not taking: Reported on 10/23/2023)     • [DISCONTINUED] Empagliflozin (JARDIANCE) 10 MG Tab tablet Take 1 Tablet by mouth every day. (Patient not taking: Reported on 10/23/2023) 90 Tablet 3     No facility-administered encounter medications on file as of 10/23/2023.     Review of Systems   Constitutional:  Positive for malaise/fatigue. Negative for chills and fever.   HENT:  Negative for congestion.    Respiratory:  Positive for shortness of breath. Negative for cough.    Cardiovascular:  Negative for chest pain, palpitations, orthopnea, leg swelling and PND.   Gastrointestinal:  Negative for abdominal pain and nausea.   Musculoskeletal:  Negative for myalgias.   Skin:  Negative for rash.   Neurological:  Negative for dizziness, loss of consciousness and headaches.   Endo/Heme/Allergies:  Does not bruise/bleed easily.   Psychiatric/Behavioral:  The patient is nervous/anxious. The patient does not have insomnia.               Objective    /72 (BP Location: Left arm, Patient Position: Sitting, BP Cuff Size: Adult)   Pulse 80   Resp 16   Ht 1.6 m (5' 3\")   Wt 106 kg (234 lb)   SpO2 94%   BMI 41.45 kg/m²     Physical Exam  Constitutional:       Appearance: She is well-developed.      Comments: BMI 41.45   HENT:      Head: Normocephalic.   Neck:      Vascular: No JVD.   Cardiovascular:      Rate and Rhythm: Normal rate and regular rhythm.      Heart sounds: " Normal heart sounds.      Comments: PM in left chest wall.  Pulmonary:      Effort: Pulmonary effort is normal. No respiratory distress.      Breath sounds: Normal breath sounds. No wheezing or rales.   Abdominal:      General: Bowel sounds are normal. There is no distension.      Palpations: Abdomen is soft.      Tenderness: There is no abdominal tenderness.   Musculoskeletal:         General: Normal range of motion.      Cervical back: Normal range of motion and neck supple.   Skin:     General: Skin is warm and dry.      Findings: No rash.   Neurological:      Mental Status: She is alert and oriented to person, place, and time.   Psychiatric:         Mood and Affect: Mood normal.         Behavior: Behavior normal.     PM interrogation today reveals normal function. NO mode switching episodes in the last 24 hours. No changes are made today.    Impression of CXR of 10/22/2023 ([facility]):  1.  No acute cardiopulmonary finding.     Impression of CXR of 10/19/2023 ([facility]):  1.  No acute cardiopulmonary finding.     B-Type Natriuretic Peptide   <=100 pg/mL 10     D-DIMER   <0.50 ug/mL <0.27      Troponin-I, HS Baseline   <14 ng/L 5      Component      Latest Ref Rng 10/22/2023   Leukocytes, Absolute      3.7 - 10.6 x10E3/uL 8.5 (E)   RBC      4.19 - 5.21 x10e6/uL 5.10 (E)   Hemoglobin      12.3 - 16.0 g/dL 15.0 (E)   Hematocrit      36.0 - 47.0 % 44.2 (E)   MCV      81.0 - 99.0 fL 86.5 (E)   MCH      28.0 - 33.8 pg 29.5 (E)   MCHC      33.1 - 36.5 g/dL 34.0 (E)   RDW      11.8 - 14.0 % 13.1 (E)   Platelet Count      146 - 390 x10E3/uL 279 (E)   MPV      6.4 - 10.2 fL 8.8 (E)   Neutrophils-Polys      41.7 - 82.3 % 63.9 (E)   Lymphocytes      10.8 - 44.4 % 24.5 (E)   Monocytes      5.0 - 12.8 % 7.8 (E)   Eosinophils      0.0 - 6.6 % 2.6 (E)   Basophils      0.0 - 1.3 % 1.2 (E)   Neutrophils (Absolute)      1.40 - 8.00 x10E3/uL 5.40 (E)   Lymphs (Absolute)      1.00 - 5.20 x10E3/uL 2.10 (E)   Monos (Absolute)      0.16 -  1.00 x10E3/uL 0.70 (E)   Eos (Absolute)      0.00 - 0.80 x10E3/uL 0.20 (E)   Baso (Absolute)      0.00 - 0.30 x10E3/uL 0.10 (E)      Component      Latest Ref Rng 10/22/2023   Sodium      136 - 144 mmol/L 138 (E)   Potassium      3.6 - 5.1 mmol/L 3.8 (E)   Chloride      102 - 110 mmol/L 103 (E)   Co2      22 - 32 mmol/L 25 (E)   Alkaline Phosphatase      38 - 126 U/L 79 (E)   AST(SGOT)      15 - 41 U/L 42 (H) (E)   ALT(SGPT)      14 - 54 U/L 69 (H) (E)   Bun      8 - 20 mg/dL 25 (H) (E)   Creatinine      0.60 - 1.10 mg/dL 0.91 (E)   Calcium      8.7 - 10.3 mg/dL 10.4 (H) (E)   Total Protein      6.4 - 8.2 g/dL 8.1 (E)   Albumin      3.5 - 4.8 g/dL 4.5 (E)   Ag Ratio      1.0 - 2.2 ratio 1.3 (E)   Anion Gap      2 - 11 mmol/L 10 (E)   Glom Filt Rate, Est      >60 mL/min/1.7 62 (E)   Globulin      2.6 - 3.1 g/dL 3.6 (H) (E)   Glucose      60 - 99 mg/dL 162 (H) (E)   Total Bilirubin      0.4 - 2.0 mg/dL 0.8 (E)          Assessment & Plan    1. Shortness of breath  EC-ECHOCARDIOGRAM COMPLETE W/O CONT    furosemide (LASIX) 20 MG Tab      2. Pacemaker - Medtronic BIV- P        3. Persistent atrial fibrillation (HCC)  EC-ECHOCARDIOGRAM COMPLETE W/O CONT      4. Paroxysmal A-fib (HCC)  furosemide (LASIX) 20 MG Tab      5. AV block, complete (HCC)        6. Circulating anticoagulants (HCC)        7. On potassium wasting diuretic therapy  potassium chloride SA (KDUR) 20 MEQ Tab CR      8. Essential hypertension, benign        9. Type 2 diabetes mellitus without complication, without long-term current use of insulin (HCC)            Medical Decision Making: Today's Assessment/Status/Plan:      1. Shortness of breath, possibly due to suboptimally treated EZEQUIEL. She does see pulmonology tomorrow for CPAP adjustment. She may need additional oxygen QHS. We will obtain STAT echocardiogram. For now, take Lasix 60mg and KCl 40mEq for the next 3 days, and then back to Lasix 40mg and KCl 40mEq daily, until we have results of echo.    2.  History of high AV block, with PAFib, with CRT-D, which is working normally. No changes are made today.    3. Chronic anticoagulation with Xarelto. She was previously on Pradaxa, but has some rectal bleeding on this; she is now back on Xarelto. She does have an appointment with GI for colonoscopy.    4. Hypertension, not currently on any meds. BP is stable.    5. Hyperlipidemia, treated with Lipitor 40mg.    6. Diabetes mellitus, currently on Amaryl 2mg once daily.    As above, we will obtain STAT echocardiogram. Increase Lasix for the next 3 days. We will make changes based on echo results. She does have follow-up with me in January 2024 as well. Keep follow-up with other providers too.                      Adeel Drummond M.D.  [address] [place] [phone]  Via Fax: [phone]

## 2023-10-24 ENCOUNTER — HOSPITAL ENCOUNTER (OUTPATIENT)
Dept: CARDIOLOGY | Facility: MEDICAL CENTER | Age: 63
End: 2023-10-24
Attending: NURSE PRACTITIONER
Payer: MEDICARE

## 2023-10-24 DIAGNOSIS — R06.02 SHORTNESS OF BREATH: ICD-10-CM

## 2023-10-24 DIAGNOSIS — I48.19 PERSISTENT ATRIAL FIBRILLATION (HCC): Chronic | ICD-10-CM

## 2023-10-24 LAB
LV EJECT FRACT  99904: 60
LV EJECT FRACT MOD 2C 99903: 63.01
LV EJECT FRACT MOD 4C 99902: 65.05
LV EJECT FRACT MOD BP 99901: 62.31

## 2023-10-24 PROCEDURE — 93306 TTE W/DOPPLER COMPLETE: CPT

## 2023-10-24 PROCEDURE — 93306 TTE W/DOPPLER COMPLETE: CPT | Mod: 26 | Performed by: INTERNAL MEDICINE

## 2023-11-02 ENCOUNTER — APPOINTMENT (OUTPATIENT)
Dept: MEDICAL GROUP | Facility: PHYSICIAN GROUP | Age: 63
End: 2023-11-02
Payer: MEDICARE

## 2023-11-07 ENCOUNTER — PATIENT MESSAGE (OUTPATIENT)
Dept: CARDIOLOGY | Facility: MEDICAL CENTER | Age: 63
End: 2023-11-07
Payer: MEDICARE

## 2023-11-07 NOTE — PATIENT COMMUNICATION
To schedulers: Please see if we can move patient's pacer check appointment up to soonest available.    Thank you!

## 2023-11-07 NOTE — PATIENT COMMUNICATION
To device team: Patient states they think they are in A fib. Are we able to get a transmission from the device she has? Or does she need to come in for a check? Please advise, thank you!!

## 2023-11-08 ENCOUNTER — OFFICE VISIT (OUTPATIENT)
Dept: CARDIOLOGY | Facility: PHYSICIAN GROUP | Age: 63
End: 2023-11-08
Payer: MEDICARE

## 2023-11-08 ENCOUNTER — TELEPHONE (OUTPATIENT)
Dept: CARDIOLOGY | Facility: MEDICAL CENTER | Age: 63
End: 2023-11-08

## 2023-11-08 VITALS
HEIGHT: 63 IN | DIASTOLIC BLOOD PRESSURE: 60 MMHG | RESPIRATION RATE: 12 BRPM | WEIGHT: 228 LBS | OXYGEN SATURATION: 96 % | BODY MASS INDEX: 40.4 KG/M2 | SYSTOLIC BLOOD PRESSURE: 100 MMHG | HEART RATE: 78 BPM

## 2023-11-08 DIAGNOSIS — D68.69 HYPERCOAGULABLE STATE DUE TO PERSISTENT ATRIAL FIBRILLATION (HCC): ICD-10-CM

## 2023-11-08 DIAGNOSIS — I10 ESSENTIAL HYPERTENSION, BENIGN: ICD-10-CM

## 2023-11-08 DIAGNOSIS — I44.2 AV BLOCK, COMPLETE (HCC): ICD-10-CM

## 2023-11-08 DIAGNOSIS — Z95.0 PACEMAKER: ICD-10-CM

## 2023-11-08 DIAGNOSIS — I48.19 PERSISTENT ATRIAL FIBRILLATION (HCC): Chronic | ICD-10-CM

## 2023-11-08 DIAGNOSIS — I48.19 HYPERCOAGULABLE STATE DUE TO PERSISTENT ATRIAL FIBRILLATION (HCC): ICD-10-CM

## 2023-11-08 DIAGNOSIS — I50.30 HEART FAILURE WITH PRESERVED EJECTION FRACTION, UNSPECIFIED HF CHRONICITY (HCC): ICD-10-CM

## 2023-11-08 DIAGNOSIS — R06.02 SHORTNESS OF BREATH: ICD-10-CM

## 2023-11-08 PROBLEM — I48.11 HYPERCOAGULABLE STATE DUE TO LONGSTANDING PERSISTENT ATRIAL FIBRILLATION (HCC): Status: ACTIVE | Noted: 2023-11-08

## 2023-11-08 LAB — EKG IMPRESSION: NORMAL

## 2023-11-08 PROCEDURE — 93000 ELECTROCARDIOGRAM COMPLETE: CPT | Performed by: INTERNAL MEDICINE

## 2023-11-08 PROCEDURE — 3074F SYST BP LT 130 MM HG: CPT | Performed by: INTERNAL MEDICINE

## 2023-11-08 PROCEDURE — 99214 OFFICE O/P EST MOD 30 MIN: CPT | Mod: 25 | Performed by: INTERNAL MEDICINE

## 2023-11-08 PROCEDURE — 3078F DIAST BP <80 MM HG: CPT | Performed by: INTERNAL MEDICINE

## 2023-11-08 RX ORDER — FUROSEMIDE 20 MG/1
20 TABLET ORAL DAILY
COMMUNITY
End: 2024-01-23 | Stop reason: SDUPTHER

## 2023-11-08 RX ORDER — EMPAGLIFLOZIN 10 MG/1
10 TABLET, FILM COATED ORAL DAILY
COMMUNITY
End: 2024-01-23 | Stop reason: SDUPTHER

## 2023-11-08 SDOH — ECONOMIC STABILITY: HOUSING INSECURITY
IN THE LAST 12 MONTHS, WAS THERE A TIME WHEN YOU DID NOT HAVE A STEADY PLACE TO SLEEP OR SLEPT IN A SHELTER (INCLUDING NOW)?: YES

## 2023-11-08 SDOH — ECONOMIC STABILITY: HOUSING INSECURITY

## 2023-11-08 SDOH — ECONOMIC STABILITY: FOOD INSECURITY: WITHIN THE PAST 12 MONTHS, THE FOOD YOU BOUGHT JUST DIDN'T LAST AND YOU DIDN'T HAVE MONEY TO GET MORE.: NEVER TRUE

## 2023-11-08 SDOH — ECONOMIC STABILITY: TRANSPORTATION INSECURITY
IN THE PAST 12 MONTHS, HAS LACK OF TRANSPORTATION KEPT YOU FROM MEETINGS, WORK, OR FROM GETTING THINGS NEEDED FOR DAILY LIVING?: NO

## 2023-11-08 SDOH — HEALTH STABILITY: PHYSICAL HEALTH: ON AVERAGE, HOW MANY DAYS PER WEEK DO YOU ENGAGE IN MODERATE TO STRENUOUS EXERCISE (LIKE A BRISK WALK)?: 3 DAYS

## 2023-11-08 SDOH — ECONOMIC STABILITY: INCOME INSECURITY: IN THE LAST 12 MONTHS, WAS THERE A TIME WHEN YOU WERE NOT ABLE TO PAY THE MORTGAGE OR RENT ON TIME?: YES

## 2023-11-08 SDOH — ECONOMIC STABILITY: HOUSING INSECURITY
IN THE LAST 12 MONTHS, WAS THERE A TIME WHEN YOU DID NOT HAVE A STEADY PLACE TO SLEEP OR SLEPT IN A SHELTER (INCLUDING NOW)?: NO

## 2023-11-08 SDOH — ECONOMIC STABILITY: INCOME INSECURITY: HOW HARD IS IT FOR YOU TO PAY FOR THE VERY BASICS LIKE FOOD, HOUSING, MEDICAL CARE, AND HEATING?: HARD

## 2023-11-08 SDOH — ECONOMIC STABILITY: TRANSPORTATION INSECURITY
IN THE PAST 12 MONTHS, HAS THE LACK OF TRANSPORTATION KEPT YOU FROM MEDICAL APPOINTMENTS OR FROM GETTING MEDICATIONS?: NO

## 2023-11-08 SDOH — ECONOMIC STABILITY: TRANSPORTATION INSECURITY
IN THE PAST 12 MONTHS, HAS LACK OF RELIABLE TRANSPORTATION KEPT YOU FROM MEDICAL APPOINTMENTS, MEETINGS, WORK OR FROM GETTING THINGS NEEDED FOR DAILY LIVING?: NO

## 2023-11-08 SDOH — HEALTH STABILITY: PHYSICAL HEALTH: ON AVERAGE, HOW MANY MINUTES DO YOU ENGAGE IN EXERCISE AT THIS LEVEL?: 30 MIN

## 2023-11-08 SDOH — ECONOMIC STABILITY: FOOD INSECURITY: WITHIN THE PAST 12 MONTHS, YOU WORRIED THAT YOUR FOOD WOULD RUN OUT BEFORE YOU GOT MONEY TO BUY MORE.: SOMETIMES TRUE

## 2023-11-08 SDOH — HEALTH STABILITY: MENTAL HEALTH
STRESS IS WHEN SOMEONE FEELS TENSE, NERVOUS, ANXIOUS, OR CAN'T SLEEP AT NIGHT BECAUSE THEIR MIND IS TROUBLED. HOW STRESSED ARE YOU?: TO SOME EXTENT

## 2023-11-08 ASSESSMENT — SOCIAL DETERMINANTS OF HEALTH (SDOH)
WITHIN THE PAST 12 MONTHS, YOU WORRIED THAT YOUR FOOD WOULD RUN OUT BEFORE YOU GOT THE MONEY TO BUY MORE: SOMETIMES TRUE
HOW HARD IS IT FOR YOU TO PAY FOR THE VERY BASICS LIKE FOOD, HOUSING, MEDICAL CARE, AND HEATING?: HARD
HOW OFTEN DO YOU ATTENT MEETINGS OF THE CLUB OR ORGANIZATION YOU BELONG TO?: MORE THAN 4 TIMES PER YEAR
DO YOU BELONG TO ANY CLUBS OR ORGANIZATIONS SUCH AS CHURCH GROUPS UNIONS, FRATERNAL OR ATHLETIC GROUPS, OR SCHOOL GROUPS?: YES
IN A TYPICAL WEEK, HOW MANY TIMES DO YOU TALK ON THE PHONE WITH FAMILY, FRIENDS, OR NEIGHBORS?: MORE THAN THREE TIMES A WEEK
HOW OFTEN DO YOU ATTEND CHURCH OR RELIGIOUS SERVICES?: MORE THAN 4 TIMES PER YEAR
HOW OFTEN DO YOU GET TOGETHER WITH FRIENDS OR RELATIVES?: TWICE A WEEK
HOW OFTEN DO YOU ATTENT MEETINGS OF THE CLUB OR ORGANIZATION YOU BELONG TO?: MORE THAN 4 TIMES PER YEAR
IN A TYPICAL WEEK, HOW MANY TIMES DO YOU TALK ON THE PHONE WITH FAMILY, FRIENDS, OR NEIGHBORS?: MORE THAN THREE TIMES A WEEK
DO YOU BELONG TO ANY CLUBS OR ORGANIZATIONS SUCH AS CHURCH GROUPS UNIONS, FRATERNAL OR ATHLETIC GROUPS, OR SCHOOL GROUPS?: YES
HOW OFTEN DO YOU ATTEND CHURCH OR RELIGIOUS SERVICES?: MORE THAN 4 TIMES PER YEAR
HOW MANY DRINKS CONTAINING ALCOHOL DO YOU HAVE ON A TYPICAL DAY WHEN YOU ARE DRINKING: PATIENT DOES NOT DRINK
HOW OFTEN DO YOU HAVE SIX OR MORE DRINKS ON ONE OCCASION: NEVER
HOW OFTEN DO YOU HAVE A DRINK CONTAINING ALCOHOL: NEVER
HOW OFTEN DO YOU GET TOGETHER WITH FRIENDS OR RELATIVES?: TWICE A WEEK

## 2023-11-08 ASSESSMENT — ENCOUNTER SYMPTOMS
SHORTNESS OF BREATH: 0
LOSS OF CONSCIOUSNESS: 0
ORTHOPNEA: 1
PND: 1
MYALGIAS: 0
DIZZINESS: 0
COUGH: 0
PALPITATIONS: 0

## 2023-11-08 ASSESSMENT — LIFESTYLE VARIABLES
AUDIT-C TOTAL SCORE: 0
HOW OFTEN DO YOU HAVE SIX OR MORE DRINKS ON ONE OCCASION: NEVER
HOW OFTEN DO YOU HAVE A DRINK CONTAINING ALCOHOL: NEVER
HOW MANY STANDARD DRINKS CONTAINING ALCOHOL DO YOU HAVE ON A TYPICAL DAY: PATIENT DOES NOT DRINK
SKIP TO QUESTIONS 9-10: 1

## 2023-11-08 ASSESSMENT — FIBROSIS 4 INDEX: FIB4 SCORE: 1.18

## 2023-11-08 NOTE — PROGRESS NOTES
Cardiology Initial Consultation Note    Date of note:    11/8/2023    Primary Care Provider: Pcp Pt States None  Referring Provider: No ref. provider found    Patient Name: Bruna Nair   YOB: 1960  MRN:              [id number]    Chief Complaint   Patient presents with    Shortness of Breath     FV Dx: Shortness of breath    Atrial Fibrillation     FV Dx: Paroxysmal A-fib       History of Present Illness: Ms. Bruna Nair is a 63-year-old woman with past medical history significant for persistent atrial fibrillation on Xarelto, history of complete AV block status post PPM with upgrade to CRT, hypertension, hyperlipidemia, DM2, EZEQUIEL on CPAP who presents to the cardiology office for follow-up.    She is known to [facility] and was last seen in the office back in October 2023.  Previously saw Dr. Dr. Holt to establish care with our practice.     She states that back in Oct, she presented to [facility] with orthopnea and dyspnea and was told that she has pulmonary edema. Was started on lasix for this. Took it for about 3 days with improvement in symptoms. Diuretics were stopped. She then developed recurrent orthopnea, PND and dyspnea about 3-4 days ago. She was concerned that she was back in atrial fibrillation given her  symptoms. She restarted her oral diuretics and started to feel better.    Today, she states that she has had significant improvement in her respiratory status.  No longer with dyspnea with minimal exertion.  Mild orthopnea that is improved overall.  Sleeps with head of bed elevated.  Has been compliant with her new CPAP machine over the past 2 days.  No chest pain or lower extremity edema at this time.        Cardiovascular Risk Factors:  1. Smoking status: Denies  2. Type II Diabetes Mellitus: Yes   Lab Results   Component Value Date/Time    HBA1C 8.8 (A) 07/11/2023 01:48 PM    HBA1C 7.6 (H) 12/13/2022 11:30 AM     3. Hypertension: Yes  4. Dyslipidemia: Yes No  "results found for: \"CHOLSTRLTOT\", \"LDL\", \"HDL\", \"TRIGLYCERIDE\"  5. Family history of early Coronary Artery Disease in a first degree relative (Male less than 55 years of age; Female less than 65 years of age): Denies      Review of Systems:  Review of Systems   Respiratory:  Negative for cough and shortness of breath.    Cardiovascular:  Positive for orthopnea and PND. Negative for chest pain, palpitations and leg swelling.   Musculoskeletal:  Negative for joint pain and myalgias.   Neurological:  Negative for dizziness and loss of consciousness.       Past Medical History:   Diagnosis Date    Anticoagulated     AV block, complete (HCC)     Diabetes mellitus (HCC)     Hyperlipidemia     Hypertension     EZEQUIEL on CPAP     Persistent atrial fibrillation (HCC) - s/p ablations 10/2023    Echocardiogram with normal LV size, mild concentric LVH, LVEF 60%. Normal RV. Enlarged RA, moderately dilated LA. Mild MR, trace TR. RVSP 24mmHg.         Past Surgical History:   Procedure Laterality Date    PACEMAKER INSERTION Left 05/16/2017    Upgrade to [employer] Viva CRT-P C6TR01; original PM implanted in 2007.    APPENDECTOMY      HYSTERECTOMY, TOTAL ABDOMINAL           Current Outpatient Medications   Medication Sig Dispense Refill    Empagliflozin (JARDIANCE) 10 MG Tab tablet Take 10 mg by mouth every day.      furosemide (LASIX) 20 MG Tab Take 20 mg by mouth every day.      vitamin D3 (CHOLECALCIFEROL) 1000 Unit (25 mcg) Tab Take 1,000 Units by mouth every day.      Prenatal Vit-DSS-Fe Cbn-FA (PRENATAL AD PO) Take  by mouth.      rivaroxaban (XARELTO) 20 MG Tab tablet Take 1 Tablet by mouth with dinner. 90 Tablet 3    potassium chloride SA (KDUR) 20 MEQ Tab CR Take 1 Tablet by mouth 2 times a day. (Patient taking differently: Take 20 mEq by mouth every day.) 60 Tablet 6    atorvastatin (LIPITOR) 40 MG Tab        No current facility-administered medications for this visit.         Allergies   Allergen Reactions    Metformin      " "Severe nausea    Sulfa Drugs Itching    Hydrocodone Nausea         Family History   Problem Relation Age of Onset    Arrythmia Neg Hx     Heart Disease Neg Hx          Social History     Socioeconomic History    Marital status:      Spouse name: Not on file    Number of children: Not on file    Years of education: Not on file    Highest education level: Not on file   Occupational History    Not on file   Tobacco Use    Smoking status: Never    Smokeless tobacco: Never   Vaping Use    Vaping Use: Not on file   Substance and Sexual Activity    Alcohol use: Not Currently    Drug use: Not on file    Sexual activity: Not on file   Other Topics Concern    Not on file   Social History Narrative    Not on file     Social Determinants of Health     Financial Resource Strain: Not on file   Food Insecurity: Not on file   Transportation Needs: Not on file   Physical Activity: Not on file   Stress: Not on file   Social Connections: Not on file   Intimate Partner Violence: Not on file   Housing Stability: Not on file         Physical Exam:  Ambulatory Vitals  /60 (BP Location: Left arm, Patient Position: Sitting)   Pulse 78   Resp 12   Ht 1.6 m (5' 3\")   Wt 103 kg (228 lb)   SpO2 96%    Oxygen Therapy:  Pulse Oximetry: 96 %  BP Readings from Last 4 Encounters:   11/08/23 100/60   10/23/23 110/72   10/23/23 110/72   08/29/23 130/86       Weight/BMI: Body mass index is 40.39 kg/m².  Wt Readings from Last 4 Encounters:   11/08/23 103 kg (228 lb)   10/23/23 106 kg (234 lb)   10/23/23 106 kg (234 lb)   08/29/23 109 kg (240 lb)         General: Not in acute distress, appears comfortable  HEENT: OP clear   Neck:  No carotid bruits, no significant JVD  CVS:  RRR, Normal S1, S2. No murmurs, rubs or gallops  Resp: Normal respiratory effort, lungs CTA bilaterally. No rales or rhonchi  Abdomen: Soft, non-distended, non-tender to palpation  Skin: No obvious rashes, no cyanosis  Neurological: Alert and oriented x3, moves all " "extremities, no focal neurologic deficits  Psychiatric: Appropriate affect  Extremities:   Extremities warm, 2+ bilateral radial pulses.  2+ bilateral dp pulses, Trace lower extremity edema      Lab Data Review:  No results found for: \"CHOLSTRLTOT\", \"LDL\", \"HDL\", \"TRIGLYCERIDE\"    Lab Results   Component Value Date/Time    SODIUM 138 10/22/2023 07:11 AM    POTASSIUM 3.8 10/22/2023 07:11 AM    CHLORIDE 103 10/22/2023 07:11 AM    CO2 25 10/22/2023 07:11 AM    GLUCOSE 162 (H) 10/22/2023 07:11 AM    BUN 25 (H) 10/22/2023 07:11 AM    CREATININE 0.91 10/22/2023 07:11 AM    GLOMRATE 62 10/22/2023 07:11 AM     Lab Results   Component Value Date/Time    ALKPHOSPHAT 79 10/22/2023 07:11 AM    ASTSGOT 42 (H) 10/22/2023 07:11 AM    ALTSGPT 69 (H) 10/22/2023 07:11 AM    TBILIRUBIN 0.8 10/22/2023 07:11 AM      Lab Results   Component Value Date/Time    HEMOGLOBIN 15.0 10/22/2023 07:11 AM     Lab Results   Component Value Date/Time    HBA1C 8.8 (A) 07/11/2023 01:48 PM    HBA1C 7.6 (H) 12/13/2022 11:30 AM         Cardiac Imaging and Procedures Review:    EKG dated 11/8/2023:   My personal interpretation is sinus rhythm, a-sensed ventricular paced rhythm    Echo dated 10/24/2023:   No prior study is available for comparison.   Mild concentric left ventricular hypertrophy. Normal left ventricular systolic function.    No regional wall motion abnormalities.   Grade II diastolic dysfunction. The left   ventricular ejection fraction is visually estimated to be 60%.  Grade II diastolic dysfunction.  Mild mitral regurgitation.  Right ventricular systolic pressure is estimated to be 24 mmHg.  Biatrial dilation      Assessment & Plan     1. Persistent atrial fibrillation (HCC) - s/p ablations  EKG - Clinic Performed      2. Essential hypertension, benign        3. AV block, complete (HCC)        4. Pacemaker - Medtronic BIV- P        5. Hypercoagulable state due to longstanding persistent atrial fibrillation (HCC)        6. Shortness of " breath              Medical Decision Making:  Ms. Bruna Nair is a 63-year-old woman with past medical history significant for persistent atrial fibrillation on Xarelto, history of complete AV block status post PPM with upgrade to CRT, hypertension, hyperlipidemia, DM2, EZEQUIEL on CPAP who presents to the cardiology office for follow-up.    1. Persistent atrial fibrillation (HCC) - s/p ablations  2. Hypercoagulable state due to longstanding persistent atrial fibrillation (HCC)  -History of true fibrillation status post prior ablation.  EKG performed in office today shows sinus rhythm with ventricular sensed rhythm.  -Continue Xarelto for systemic anticoagulation given elevated [id number] score.    3. Essential hypertension, benign  -Blood pressures controlled overall.  Noted to have mild LVH on echocardiogram.  Currently on blood pressure medication.  Advised to maintain a blood pressure log at home and to call our office if she develops elevated blood pressure.  May need initiation of BP medications.    4. AV block, complete (HCC)  5. Pacemaker - Medtronic BIV- P  -History of complete AV block status post PPM.  Currently stable.  Follow-up in device clinic.    6. Shortness of breath  7.  Heart failure with preserved ejection fraction  -She has been experiencing symptoms of dyspnea on exertion as well as orthopnea over the past several weeks.  Was diagnosed with pulmonary edema while at [facility].  Has been maintained on as needed oral diuretics.  Currently feeling much better after restarting Lasix 20 mg daily.  She has trace lower extremity edema.  She is feeling better but not 100% back to baseline.  As such, recommend continuation of Lasix 20 mg daily for an additional 3 to 5 days.  Continue potassium supplementation with Lasix.  She has been instructed that in the future if she has recurrent respiratory symptoms or increased weight gain in a short period of time, to restart taking Lasix.  -Suspect she  has component of diastolic heart failure given normal EF on echocardiogram.  She has LVH as well as grade 2 diastolic dysfunction.  -continue jardiance      It was a pleasure seeing Ms. Bruna Nair in the office today. Return in about 3 months (around 2/8/2024) for Heart failure, Atrial fibrillation. Patient is aware to call the cardiology clinic with any questions or concerns.      Jasen Ramírez MD, [facility]  Cardiologist, [facility] [[email or web address] for Advanced Medicine, [facility]  [address] [address]  Phone: [phone]  Fax: [phone]    Please note that this dictation was created using voice recognition software. I have made every reasonable attempt to correct obvious errors, but it is possible there are errors of grammar and possibly content that I did not discover before finalizing the note.

## 2023-11-08 NOTE — TELEPHONE ENCOUNTER
PT currently has the soonest PC check appointment available - Will place PT on cancellation list - CR

## 2023-11-09 ENCOUNTER — OFFICE VISIT (OUTPATIENT)
Dept: MEDICAL GROUP | Facility: PHYSICIAN GROUP | Age: 63
End: 2023-11-09
Payer: MEDICARE

## 2023-11-09 VITALS
DIASTOLIC BLOOD PRESSURE: 70 MMHG | TEMPERATURE: 97.4 F | HEART RATE: 88 BPM | BODY MASS INDEX: 38.93 KG/M2 | SYSTOLIC BLOOD PRESSURE: 110 MMHG | OXYGEN SATURATION: 96 % | RESPIRATION RATE: 20 BRPM | WEIGHT: 228 LBS | HEIGHT: 64 IN

## 2023-11-09 DIAGNOSIS — E11.9 TYPE 2 DIABETES MELLITUS WITHOUT COMPLICATION, WITHOUT LONG-TERM CURRENT USE OF INSULIN (HCC): ICD-10-CM

## 2023-11-09 DIAGNOSIS — I50.30 HEART FAILURE WITH PRESERVED EJECTION FRACTION, UNSPECIFIED HF CHRONICITY (HCC): ICD-10-CM

## 2023-11-09 DIAGNOSIS — R22.1 FULLNESS OF NECK: ICD-10-CM

## 2023-11-09 DIAGNOSIS — G47.33 OSA ON CPAP: Chronic | ICD-10-CM

## 2023-11-09 DIAGNOSIS — Z91.89 AT RISK FOR BREAST CANCER: ICD-10-CM

## 2023-11-09 DIAGNOSIS — I48.19 PERSISTENT ATRIAL FIBRILLATION (HCC): Chronic | ICD-10-CM

## 2023-11-09 DIAGNOSIS — F51.02 ADJUSTMENT INSOMNIA: ICD-10-CM

## 2023-11-09 DIAGNOSIS — F41.9 ANXIETY AND DEPRESSION: ICD-10-CM

## 2023-11-09 DIAGNOSIS — Z00.00 HEALTHCARE MAINTENANCE: ICD-10-CM

## 2023-11-09 DIAGNOSIS — F32.A ANXIETY AND DEPRESSION: ICD-10-CM

## 2023-11-09 PROCEDURE — 3074F SYST BP LT 130 MM HG: CPT | Performed by: STUDENT IN AN ORGANIZED HEALTH CARE EDUCATION/TRAINING PROGRAM

## 2023-11-09 PROCEDURE — 3078F DIAST BP <80 MM HG: CPT | Performed by: STUDENT IN AN ORGANIZED HEALTH CARE EDUCATION/TRAINING PROGRAM

## 2023-11-09 PROCEDURE — 99204 OFFICE O/P NEW MOD 45 MIN: CPT | Performed by: STUDENT IN AN ORGANIZED HEALTH CARE EDUCATION/TRAINING PROGRAM

## 2023-11-09 RX ORDER — TRAZODONE HYDROCHLORIDE 50 MG/1
50 TABLET ORAL
COMMUNITY
Start: 2023-10-26 | End: 2023-11-09

## 2023-11-09 RX ORDER — GLIMEPIRIDE 2 MG/1
TABLET ORAL
COMMUNITY
End: 2023-11-09

## 2023-11-09 RX ORDER — ATORVASTATIN CALCIUM 40 MG/1
TABLET, FILM COATED ORAL
COMMUNITY
End: 2023-11-09

## 2023-11-09 RX ORDER — AMITRIPTYLINE HYDROCHLORIDE 25 MG/1
25 TABLET, FILM COATED ORAL NIGHTLY
Qty: 30 TABLET | Refills: 1 | Status: SHIPPED | OUTPATIENT
Start: 2023-11-09 | End: 2024-01-23

## 2023-11-09 ASSESSMENT — PATIENT HEALTH QUESTIONNAIRE - PHQ9
7. TROUBLE CONCENTRATING ON THINGS, SUCH AS READING THE NEWSPAPER OR WATCHING TELEVISION: NEARLY EVERY DAY
5. POOR APPETITE OR OVEREATING: SEVERAL DAYS
6. FEELING BAD ABOUT YOURSELF - OR THAT YOU ARE A FAILURE OR HAVE LET YOURSELF OR YOUR FAMILY DOWN: MORE THAN HALF THE DAYS
8. MOVING OR SPEAKING SO SLOWLY THAT OTHER PEOPLE COULD HAVE NOTICED. OR THE OPPOSITE, BEING SO FIGETY OR RESTLESS THAT YOU HAVE BEEN MOVING AROUND A LOT MORE THAN USUAL: NOT AT ALL
1. LITTLE INTEREST OR PLEASURE IN DOING THINGS: NOT AT ALL
SUM OF ALL RESPONSES TO PHQ QUESTIONS 1-9: 15
3. TROUBLE FALLING OR STAYING ASLEEP OR SLEEPING TOO MUCH: NEARLY EVERY DAY
SUM OF ALL RESPONSES TO PHQ9 QUESTIONS 1 AND 2: 3
9. THOUGHTS THAT YOU WOULD BE BETTER OFF DEAD, OR OF HURTING YOURSELF: NOT AT ALL
2. FEELING DOWN, DEPRESSED, IRRITABLE, OR HOPELESS: NEARLY EVERY DAY
4. FEELING TIRED OR HAVING LITTLE ENERGY: NEARLY EVERY DAY

## 2023-11-09 ASSESSMENT — FIBROSIS 4 INDEX: FIB4 SCORE: 1.18

## 2023-11-09 NOTE — ASSESSMENT & PLAN NOTE
No masses or nodules palpated on exam.  We will order an ultrasound to confirm  Thyroid labs ordered

## 2023-11-09 NOTE — ASSESSMENT & PLAN NOTE
Patient here for a preventive medicine visit today and to establish care.  -Reviewed all past medical history, family history, social history    -Diet and exercise appropriate counseling given  -Social determinants of health reviewed  -Tobacco, alcohol, recreational drug use: Reviewed no concerns  -Cholesterol screening: Reviewed from 2023, total cholesterol 137, LDL 58 well-controlled  -Diabetes screening: A1c 8.8 4 months ago  - DEXA scan: We will order at next visit    Immunizations  Immunizations: Declines shingles, influenza, tetanus like to discuss this later    Cancer screenings:  Colorectal cancer screening: Declines testing at this time  Cervical Cancer Screening: last pap smear over 5 years ago. Declines further testing   Breast Cancer Screening: last mammogram this year normal. Mother had ovarian cancer, maternal grandmother with breast cancer  ordered genetic screening .          ----BRCA SCREENING: FHS-7 score:      Ob-Gyn/ History:   /Para:  vag  Hx of abnormal Pap smears: none  Gyn Surgery: No    Patient Counseling:  --Discussed moderation in caloric intake, sufficient fresh fruits/vegetables, fiber, iron,  --Discussed brushing, flossing, and dental visits.   --Encouraged regular exercise.   --Discussed tobacco, alcohol, or other drug use.  --Discussed sexually transmitted infections  --Injury prevention: Discussed

## 2023-11-09 NOTE — PROGRESS NOTES
Subjective:   HISTORY OF THE PRESENT ILLNESS: Patient is a 63 y.o. female here today to establish care. His/her prior PCP was CT.    Problem   Fullness of Neck    Reports he has a fullness in her neck feels like she is being choked.  Denies any significant trouble swallowing or breathing.     Adjustment Insomnia   Healthcare Maintenance   Diabetes Mellitus (Hcc)    She is currently on jardiance 10mg daily, has been on this for 1 week   Last A1c 4m ago 8.8  Blood sugar logs: FBS       Persistent atrial fibrillation (HCC) - s/p ablations    October 2023: Echocardiogram with normal LV size, mild concentric LVH, LVEF 60%. Normal RV. Enlarged RA, moderately dilated LA. Mild MR, trace TR. RVSP 24mmHg.     Memo On Cpap          Review of systems:  Denies chest pain, shortness of breath, edema, nausea, vomiting    Patient Active Problem List    Diagnosis Date Noted    Fullness of neck 11/09/2023    Adjustment insomnia 11/09/2023    Healthcare maintenance 11/09/2023    Hypercoagulable state due to longstanding persistent atrial fibrillation ([facility]) 11/08/2023    (HFpEF) heart failure with preserved ejection fraction ([facility]) 11/08/2023    Circulating anticoagulants ([facility]) 10/23/2023    AV block, complete ([facility]) 10/23/2023    Essential hypertension, benign 10/23/2023    Diabetes mellitus ([facility]) 10/23/2023    Persistent atrial fibrillation ([facility]) - s/p ablations     MEMO on CPAP     Morbid obesity ([facility])     Pacemaker - Medtronic BIV- P 12/09/2022    History of DVT (deep vein thrombosis) 12/09/2022    Current mild episode of major depressive disorder without prior episode ([facility]) 12/09/2022     Past Surgical History:   Procedure Laterality Date    PACEMAKER INSERTION Left 05/16/2017    Upgrade to [employer] Viva CRT-P C6TR01; original PM implanted in 2007.    APPENDECTOMY      HYSTERECTOMY, TOTAL ABDOMINAL       Social History     Tobacco Use    Smoking status: Never    Smokeless tobacco: Never   Substance Use Topics    Alcohol use:  "Not Currently    Drug use: Never      Family History   Problem Relation Age of Onset    Ovarian Cancer Mother     Breast Cancer Maternal Grandmother     Arrythmia Neg Hx     Heart Disease Neg Hx      Current Outpatient Medications   Medication Sig Dispense Refill    amitriptyline (ELAVIL) 25 MG Tab Take 1 Tablet by mouth every evening. 30 Tablet 1    Empagliflozin (JARDIANCE) 10 MG Tab tablet Take 10 mg by mouth every day.      furosemide (LASIX) 20 MG Tab Take 20 mg by mouth every day.      vitamin D3 (CHOLECALCIFEROL) 1000 Unit (25 mcg) Tab Take 1,000 Units by mouth every day.      Prenatal Vit-DSS-Fe Cbn-FA (PRENATAL AD PO) Take  by mouth.      potassium chloride SA (KDUR) 20 MEQ Tab CR Take 1 Tablet by mouth 2 times a day. (Patient taking differently: Take 20 mEq by mouth every day.) 60 Tablet 6    rivaroxaban (XARELTO) 20 MG Tab tablet Take 1 Tablet by mouth with dinner. 90 Tablet 3    atorvastatin (LIPITOR) 40 MG Tab        No current facility-administered medications for this visit.       Allergies:  Allergies   Allergen Reactions    Codeine      Other Reaction(s): .Unknown    Metformin Nausea     Severe nausea    Sulfa Drugs Itching     Other Reaction(s): Itching    Hydrocodone Nausea       Allergies, past medical history, past surgical history, family history, social history reviewed and updated.    Objective:    /70 (BP Location: Left arm, Patient Position: Sitting, BP Cuff Size: Large adult)   Pulse 88   Temp 36.3 °C (97.4 °F) (Temporal)   Resp 20   Ht 1.626 m (5' 4\")   Wt 103 kg (228 lb)   SpO2 96%   BMI 39.14 kg/m²    Body mass index is 39.14 kg/m².    Physical exam:  General: Normal appearance, no acute distress, not ill-appearing  HEENT: EOM intact, conjunctiva normal limits, negative right/left eye discharge.  Sclera anicteric  Cardiovascular: Normal rate and rhythm, no murmurs  Pulmonary: No respiratory distress, no wheezing, no rales, breath sounds normal.  Musculoskeletal: No edema " bilaterally  Skin: Warm, dry, no lesions  Neurological: No focal deficits, normal gait  Psychiatric: Mood within normal limits    Assessment/Plan:      Problem List Items Addressed This Visit       Persistent atrial fibrillation (HCC) - s/p ablations (Chronic)     Following with cardiology.  Continue Xarelto 20 mg daily.  Chronic, stable condition.         EZEQUIEL on CPAP (Chronic)     On cpap   Chronic stable   Following with [facility]          Diabetes mellitus (HCC)     Last A1c 8.8.  Repeat A1c ordered, continue Jardiance 10 mg daily this is a new medication for her.    Follow-up in 1 month diabetic care gaps, annual wellness visit         Relevant Orders    HEMOGLOBIN A1C    (HFpEF) heart failure with preserved ejection fraction (HCC)    Relevant Orders    TSH+FREE T4    Fullness of neck     No masses or nodules palpated on exam.  We will order an ultrasound to confirm  Thyroid labs ordered         Relevant Orders    US-SOFT TISSUES OF HEAD - NECK    Adjustment insomnia     Insomnia secondary to anxiety and depression.  We will try trazodone worked for her however she had a significant reaction to this.  We will try amitriptyline 25 mg nightly.         Relevant Medications    amitriptyline (ELAVIL) 25 MG Tab    Healthcare maintenance     Patient here for a preventive medicine visit today and to establish care.  -Reviewed all past medical history, family history, social history    -Diet and exercise appropriate counseling given  -Social determinants of health reviewed  -Tobacco, alcohol, recreational drug use: Reviewed no concerns  -Cholesterol screening: Reviewed from July 2023, total cholesterol 137, LDL 58 well-controlled  -Diabetes screening: A1c 8.8 4 months ago  - DEXA scan: We will order at next visit    Immunizations  Immunizations: Declines shingles, influenza, tetanus like to discuss this later    Cancer screenings:  Colorectal cancer screening: Declines testing at this time  Cervical Cancer  Screening: last pap smear over 5 years ago. Declines further testing   Breast Cancer Screening: last mammogram this year normal. Mother had ovarian cancer, maternal grandmother with breast cancer  ordered genetic screening .          ----BRCA SCREENING: FHS-7 score:      Ob-Gyn/ History:   /Para:  vag  Hx of abnormal Pap smears: none  Gyn Surgery: No    Patient Counseling:  --Discussed moderation in caloric intake, sufficient fresh fruits/vegetables, fiber, iron,  --Discussed brushing, flossing, and dental visits.   --Encouraged regular exercise.   --Discussed tobacco, alcohol, or other drug use.  --Discussed sexually transmitted infections  --Injury prevention: Discussed             Other Visit Diagnoses       Anxiety and depression        Relevant Medications    amitriptyline (ELAVIL) 25 MG Tab    At risk for breast cancer        Relevant Orders    Referral to Genetic Research Studies             Return in about 4 weeks (around 2023) for diabetes.

## 2023-11-09 NOTE — ASSESSMENT & PLAN NOTE
Last A1c 8.8.  Repeat A1c ordered, continue Jardiance 10 mg daily this is a new medication for her.    Follow-up in 1 month diabetic care gaps, annual wellness visit

## 2023-11-09 NOTE — ASSESSMENT & PLAN NOTE
Insomnia secondary to anxiety and depression.  We will try trazodone worked for her however she had a significant reaction to this.  We will try amitriptyline 25 mg nightly.

## 2023-11-15 ENCOUNTER — APPOINTMENT (OUTPATIENT)
Dept: CARDIOLOGY | Facility: PHYSICIAN GROUP | Age: 63
End: 2023-11-15
Payer: MEDICARE

## 2023-11-21 ENCOUNTER — APPOINTMENT (OUTPATIENT)
Dept: RESEARCH | Facility: MEDICAL CENTER | Age: 63
End: 2023-11-21
Attending: STUDENT IN AN ORGANIZED HEALTH CARE EDUCATION/TRAINING PROGRAM
Payer: MEDICARE

## 2023-12-07 ENCOUNTER — HOSPITAL ENCOUNTER (OUTPATIENT)
Dept: RADIOLOGY | Facility: MEDICAL CENTER | Age: 63
End: 2023-12-07
Attending: STUDENT IN AN ORGANIZED HEALTH CARE EDUCATION/TRAINING PROGRAM
Payer: MEDICARE

## 2023-12-07 ENCOUNTER — HOSPITAL ENCOUNTER (OUTPATIENT)
Dept: LAB | Facility: MEDICAL CENTER | Age: 63
End: 2023-12-07
Attending: STUDENT IN AN ORGANIZED HEALTH CARE EDUCATION/TRAINING PROGRAM
Payer: MEDICARE

## 2023-12-07 DIAGNOSIS — R22.1 FULLNESS OF NECK: ICD-10-CM

## 2023-12-07 DIAGNOSIS — E11.9 TYPE 2 DIABETES MELLITUS WITHOUT COMPLICATION, WITHOUT LONG-TERM CURRENT USE OF INSULIN (HCC): ICD-10-CM

## 2023-12-07 LAB
EST. AVERAGE GLUCOSE BLD GHB EST-MCNC: 128 MG/DL
HBA1C MFR BLD: 6.1 % (ref 4–5.6)
T4 FREE SERPL-MCNC: 1.3 NG/DL (ref 0.93–1.7)
TSH SERPL DL<=0.005 MIU/L-ACNC: 1.15 UIU/ML (ref 0.38–5.33)

## 2023-12-07 PROCEDURE — 84439 ASSAY OF FREE THYROXINE: CPT

## 2023-12-07 PROCEDURE — 83036 HEMOGLOBIN GLYCOSYLATED A1C: CPT

## 2023-12-07 PROCEDURE — 76536 US EXAM OF HEAD AND NECK: CPT

## 2023-12-07 PROCEDURE — 36415 COLL VENOUS BLD VENIPUNCTURE: CPT

## 2023-12-07 PROCEDURE — 84443 ASSAY THYROID STIM HORMONE: CPT

## 2023-12-09 ENCOUNTER — OFFICE VISIT (OUTPATIENT)
Dept: URGENT CARE | Facility: CLINIC | Age: 63
End: 2023-12-09
Payer: MEDICARE

## 2023-12-09 VITALS
SYSTOLIC BLOOD PRESSURE: 114 MMHG | HEIGHT: 64 IN | HEART RATE: 78 BPM | RESPIRATION RATE: 16 BRPM | BODY MASS INDEX: 38.93 KG/M2 | WEIGHT: 228 LBS | TEMPERATURE: 98.4 F | DIASTOLIC BLOOD PRESSURE: 78 MMHG | OXYGEN SATURATION: 97 %

## 2023-12-09 DIAGNOSIS — R68.89 FLU-LIKE SYMPTOMS: ICD-10-CM

## 2023-12-09 DIAGNOSIS — U07.1 COVID: ICD-10-CM

## 2023-12-09 DIAGNOSIS — J02.9 SORE THROAT: ICD-10-CM

## 2023-12-09 LAB
FLUAV RNA SPEC QL NAA+PROBE: NEGATIVE
FLUBV RNA SPEC QL NAA+PROBE: NEGATIVE
RSV RNA SPEC QL NAA+PROBE: NEGATIVE
S PYO DNA SPEC NAA+PROBE: NOT DETECTED
SARS-COV-2 RNA RESP QL NAA+PROBE: POSITIVE

## 2023-12-09 PROCEDURE — 99213 OFFICE O/P EST LOW 20 MIN: CPT | Performed by: FAMILY MEDICINE

## 2023-12-09 PROCEDURE — 3074F SYST BP LT 130 MM HG: CPT | Performed by: FAMILY MEDICINE

## 2023-12-09 PROCEDURE — 3078F DIAST BP <80 MM HG: CPT | Performed by: FAMILY MEDICINE

## 2023-12-09 PROCEDURE — 87651 STREP A DNA AMP PROBE: CPT | Performed by: FAMILY MEDICINE

## 2023-12-09 PROCEDURE — 0241U POCT CEPHEID COV-2, FLU A/B, RSV - PCR: CPT | Performed by: FAMILY MEDICINE

## 2023-12-09 RX ORDER — LIDOCAINE HYDROCHLORIDE 20 MG/ML
5 SOLUTION OROPHARYNGEAL EVERY 4 HOURS PRN
Qty: 100 ML | Refills: 0 | Status: SHIPPED | OUTPATIENT
Start: 2023-12-09 | End: 2023-12-09

## 2023-12-09 RX ORDER — LIDOCAINE HYDROCHLORIDE 20 MG/ML
5 SOLUTION OROPHARYNGEAL EVERY 4 HOURS PRN
Qty: 100 ML | Refills: 0 | Status: SHIPPED | OUTPATIENT
Start: 2023-12-09 | End: 2024-01-23

## 2023-12-09 ASSESSMENT — ENCOUNTER SYMPTOMS
FEVER: 1
SINUS PAIN: 1
SORE THROAT: 1
CARDIOVASCULAR NEGATIVE: 1
GASTROINTESTINAL NEGATIVE: 1
CHILLS: 1

## 2023-12-09 ASSESSMENT — FIBROSIS 4 INDEX: FIB4 SCORE: 1.18

## 2023-12-09 NOTE — PROGRESS NOTES
"Subjective:   Bruna Nair is a 63 y.o. female who presents for Sore Throat (Bilateral ear pain, slight cough x 2 days )      HPI    Review of Systems   Constitutional:  Positive for chills, fever and malaise/fatigue.   HENT:  Positive for congestion, sinus pain and sore throat.    Cardiovascular: Negative.    Gastrointestinal: Negative.    Genitourinary: Negative.    Skin: Negative.        Medications, Allergies, and current problem list reviewed today in Epic.     Objective:     /78 (BP Location: Left arm, Patient Position: Sitting, BP Cuff Size: Large adult)   Pulse 78   Temp 36.9 °C (98.4 °F) (Temporal)   Resp 16   Ht 1.626 m (5' 4\")   Wt 103 kg (228 lb)   SpO2 97%     Physical Exam  Vitals and nursing note reviewed.   Constitutional:       Appearance: Normal appearance.   HENT:      Head: Normocephalic and atraumatic.      Right Ear: Tympanic membrane normal.      Left Ear: Tympanic membrane normal.      Nose: Congestion present.      Mouth/Throat:      Pharynx: Posterior oropharyngeal erythema present.   Cardiovascular:      Rate and Rhythm: Normal rate and regular rhythm.      Pulses: Normal pulses.      Heart sounds: Normal heart sounds.   Pulmonary:      Effort: Pulmonary effort is normal.      Breath sounds: Normal breath sounds.   Abdominal:      General: Abdomen is flat. Bowel sounds are normal.      Palpations: Abdomen is soft.   Lymphadenopathy:      Cervical: No cervical adenopathy.   Neurological:      Mental Status: She is alert.         Assessment/Plan:     Diagnosis and associated orders:     1. Sore throat  POCT CEPHEID GROUP A STREP - PCR    lidocaine (XYLOCAINE) 2 % Solution      2. Flu-like symptoms  POCT CEPHEID COV-2, FLU A/B, RSV - PCR         Comments/MDM:              Differential diagnosis, natural history, supportive care, and indications for immediate follow-up discussed.    Advised the patient to follow-up with the primary care physician for recheck, reevaluation, and " consideration of further management.    Please note that this dictation was created using voice recognition software. I have made a reasonable attempt to correct obvious errors, but I expect that there are errors of grammar and possibly content that I did not discover before finalizing the note.    This note was electronically signed by Luis Lawrence M.D.

## 2023-12-11 ENCOUNTER — PATIENT MESSAGE (OUTPATIENT)
Dept: CARDIOLOGY | Facility: MEDICAL CENTER | Age: 63
End: 2023-12-11
Payer: MEDICARE

## 2023-12-11 NOTE — PATIENT COMMUNICATION
CONOR patient, Last OV 11/08/23. CONOR OOO on VAC    CW: Pt also had OV w/ you 08/2023  Please advise as ADD on [employer] message regarding medications. Thanks!

## 2023-12-14 ENCOUNTER — TELEPHONE (OUTPATIENT)
Dept: MEDICAL GROUP | Facility: PHYSICIAN GROUP | Age: 63
End: 2023-12-14
Payer: MEDICARE

## 2023-12-14 DIAGNOSIS — E04.1 THYROID NODULE: ICD-10-CM

## 2023-12-14 DIAGNOSIS — E04.2 MULTIPLE THYROID NODULES: ICD-10-CM

## 2023-12-14 NOTE — TELEPHONE ENCOUNTER
----- Message from Johnathan Romero D.O. sent at 12/14/2023 10:36 AM PST -----  Please let patient know there are some thyriod nodules on ultrasound, some of them are recommended to be biopsied    Will send for biopsy     Raffi Romero DO

## 2023-12-14 NOTE — TELEPHONE ENCOUNTER
Retinal eye exam done at Dr. Zamora. She states this information was req last visit to get records

## 2023-12-14 NOTE — TELEPHONE ENCOUNTER
Called and spoke to patient to inform her of her US results. I informed her that a new US for biopsy was placed. Patient will call to schedule US.

## 2023-12-19 ENCOUNTER — HOSPITAL ENCOUNTER (OUTPATIENT)
Dept: RADIOLOGY | Facility: MEDICAL CENTER | Age: 63
End: 2023-12-19
Attending: STUDENT IN AN ORGANIZED HEALTH CARE EDUCATION/TRAINING PROGRAM
Payer: MEDICARE

## 2023-12-19 DIAGNOSIS — E04.2 MULTIPLE THYROID NODULES: ICD-10-CM

## 2023-12-19 PROCEDURE — 10005 FNA BX W/US GDN 1ST LES: CPT

## 2023-12-19 PROCEDURE — 88173 CYTOPATH EVAL FNA REPORT: CPT

## 2023-12-19 NOTE — PROGRESS NOTES
US guided FNA biopsy of right lower thyroid nodule done by Dr. Grimes; NON-SEDATION (no H&P required as this is NON SEDATION procedure)  dressing CDI; aspiration in 1 jar cytolyte and 1 jar affirma . Aspiration obtained and sent to lab. Pt hemodynamically stable pre/intra/post procedure; all questions and concerns answered prior to being d/c; patient provided with appropriate education for procedure; pt d/c home.

## 2023-12-20 LAB — CYTOLOGY REG CYTOL: NORMAL

## 2023-12-26 DIAGNOSIS — D49.7 FOLLICULAR NEOPLASM OF THYROID: ICD-10-CM

## 2023-12-27 DIAGNOSIS — D49.7 FOLLICULAR NEOPLASM OF THYROID: ICD-10-CM

## 2023-12-27 NOTE — RESULT ENCOUNTER NOTE
Please advise patient that referral has been placed to endocrinology to help evaluate next steps in the thyroid nodules as the biopsy showed follicular neoplasm.   They could recommend surgery or monitoring but will discuss options with the patient based on the results.   Hung Dacosta M.D.  Covering results for Dr. Romero today.

## 2024-01-02 ENCOUNTER — E-CONSULT (OUTPATIENT)
Dept: ENDOCRINOLOGY | Facility: MEDICAL CENTER | Age: 64
End: 2024-01-02
Payer: MEDICARE

## 2024-01-02 DIAGNOSIS — Z71.9 ENCOUNTER FOR CONSULTATION: ICD-10-CM

## 2024-01-02 PROCEDURE — 99451 NTRPROF PH1/NTRNET/EHR 5/>: CPT | Performed by: STUDENT IN AN ORGANIZED HEALTH CARE EDUCATION/TRAINING PROGRAM

## 2024-01-02 NOTE — PROGRESS NOTES
"E-Consult Response     After careful review of the patient's information available in the medical record, the following are my findings and recommendations:    Reason for consult:  thyroid nodule evaluation and management  \"Ultrasound thyroid shows multiple nodules, one of them on the right TI-RADS 4 moderately suspicious.  FNA biopsy done showing [place] category 4, follicular neoplasm Afirma testing reportedly inadequate RNA yield to run sample. TSH normal\"    Summary of data reviewed:   - 62 yo F  - history of mutinodular goiter with compressive symptoms (\"feels like being choked\")  - euthyroid    Labs:      Imaging:  Thyroid US on 12/7/2023:  HISTORY/REASON FOR EXAM:  Neck fullness, feels like being choked.  TECHNIQUE/EXAM DESCRIPTION: Ultrasound of the soft tissues of the head and neck.  COMPARISON:  None  FINDINGS:  The thyroid gland is heterogeneous.  Vascularity is normal.  The right lobe of the thyroid gland measures 2.10 cm x 6.22 cm x 2.69 cm.  The left lobe of the thyroid gland measures 1.28 cm x 5.98 cm x 1.48 cm.  The isthmus measures 0.38 cm.  Nodules >= 1cm:  Nodule #1  Location:  Right  mid  Size:  2.5 x 2.2 x 1.5 cm  Composition:  Solid-2  Echogenicity:  Isoechoic-1  Shape:  Wider than tall-0  Margins:  Smooth-0  Echogenic Foci:  None-0  ACR TIRADS points/category:  3 - TR3 - Mildly Suspicious  Nodule #2  Location:  Right  lower  Size:  3.0 x 2.6 x 2.1 cm  Composition:  Solid-2  Echogenicity:  Isoechoic-1  Shape:  Wider than tall-0  Margins:  Smooth-0  Echogenic Foci:  Macroscopic-1  ACR TIRADS points/category:  4 - TR4 - Moderately Suspicious       Nodule #3  Location:  Left  upper  Size:  1.2 x 0.5 x 0.8 cm  Composition:  Spongiform-0  Echogenicity:  Isoechoic-1  Shape:  Wider than tall-0  Margins:  Smooth-0  Echogenic Foci:  None-0  ACR TIRADS points/category:  0 - TR1 - Benign  Nodule #4  Location:  Left  upper  Size:  1.0 x 0.8 x 0.5 cm  Composition:  Spongiform-0  Echogenicity:  " Isoechoic-1  Shape:  Wider than tall-0  Margins:  Smooth-0  Echogenic Foci:  None-0  ACR TIRADS points/category:  2 - TR2 - Not Suspicious  IMPRESSION:  Bilateral solid thyroid nodules. The largest nodule is located within the right lower pole measuring 3.0 x 2.6 x 2.1 cm in size.  ACR TI-RADS Recommendations  TR4 (>= 1.5cm) - Based solely on ultrasound findings, FNA could be considered  Recommendations based on the [employer] Thyroid imaging, reporting and Data System (TI-RADS) 2017.    FNA of nodule #2 on 12/19/23:  [place] 4 -> Afirma -> inadequate RNA yield to run sample     SUMMARY:  - 64 yo patient with non-toxic multinodular goiter with concerning for cancer nodule ([place] IV - follicular neoplasm vs suspicious for a follicular neoplasm) with compressive symptoms, unfortunately Inadequate sample to run genetic testing  - the best way to proceed is to repeat FNA with genetic testing    Recommendations:   Best way to proceed is to repeat FNA with genetic testing.    E-Consult Time: 26 minutes were spent with >50% of the total time spent reviewing items outlined in the summary of data reviewed (Use code [id number])    Priya Welch M.D.

## 2024-01-03 ENCOUNTER — APPOINTMENT (OUTPATIENT)
Dept: CARDIOLOGY | Facility: PHYSICIAN GROUP | Age: 64
End: 2024-01-03
Payer: MEDICARE

## 2024-01-09 DIAGNOSIS — E04.1 THYROID NODULE: ICD-10-CM

## 2024-01-09 DIAGNOSIS — D49.7 FOLLICULAR NEOPLASM OF THYROID: ICD-10-CM

## 2024-01-22 ENCOUNTER — TELEPHONE (OUTPATIENT)
Dept: MEDICAL GROUP | Facility: PHYSICIAN GROUP | Age: 64
End: 2024-01-22
Payer: MEDICARE

## 2024-01-22 NOTE — TELEPHONE ENCOUNTER
Hello,    Pt came back down and stated she would like her supplies sent to [facility] [place] on     [address]  [address] [place] [address].     Phone number [phone] and fax [phone].    Thanks.

## 2024-01-23 ENCOUNTER — NON-PROVIDER VISIT (OUTPATIENT)
Dept: CARDIOLOGY | Facility: MEDICAL CENTER | Age: 64
End: 2024-01-23
Attending: NURSE PRACTITIONER
Payer: MEDICARE

## 2024-01-23 ENCOUNTER — OFFICE VISIT (OUTPATIENT)
Dept: CARDIOLOGY | Facility: MEDICAL CENTER | Age: 64
End: 2024-01-23
Attending: INTERNAL MEDICINE
Payer: MEDICARE

## 2024-01-23 VITALS
DIASTOLIC BLOOD PRESSURE: 62 MMHG | BODY MASS INDEX: 35.17 KG/M2 | RESPIRATION RATE: 16 BRPM | HEART RATE: 86 BPM | OXYGEN SATURATION: 95 % | WEIGHT: 206 LBS | SYSTOLIC BLOOD PRESSURE: 112 MMHG | HEIGHT: 64 IN

## 2024-01-23 DIAGNOSIS — D68.69 HYPERCOAGULABLE STATE DUE TO PERSISTENT ATRIAL FIBRILLATION (HCC): ICD-10-CM

## 2024-01-23 DIAGNOSIS — E11.9 TYPE 2 DIABETES MELLITUS WITHOUT COMPLICATION, WITHOUT LONG-TERM CURRENT USE OF INSULIN (HCC): ICD-10-CM

## 2024-01-23 DIAGNOSIS — E78.5 DYSLIPIDEMIA: ICD-10-CM

## 2024-01-23 DIAGNOSIS — I48.19 PERSISTENT ATRIAL FIBRILLATION (HCC): Chronic | ICD-10-CM

## 2024-01-23 DIAGNOSIS — Z79.899 ON POTASSIUM WASTING DIURETIC THERAPY: ICD-10-CM

## 2024-01-23 DIAGNOSIS — Z95.0 PACEMAKER: ICD-10-CM

## 2024-01-23 DIAGNOSIS — I50.32 CHRONIC HEART FAILURE WITH PRESERVED EJECTION FRACTION (HCC): ICD-10-CM

## 2024-01-23 DIAGNOSIS — I48.19 HYPERCOAGULABLE STATE DUE TO PERSISTENT ATRIAL FIBRILLATION (HCC): ICD-10-CM

## 2024-01-23 DIAGNOSIS — I42.9 CARDIOMYOPATHY, UNSPECIFIED TYPE (HCC): ICD-10-CM

## 2024-01-23 PROCEDURE — 99212 OFFICE O/P EST SF 10 MIN: CPT | Performed by: INTERNAL MEDICINE

## 2024-01-23 PROCEDURE — 93289 INTERROG DEVICE EVAL HEART: CPT | Mod: 26 | Performed by: NURSE PRACTITIONER

## 2024-01-23 PROCEDURE — 93289 INTERROG DEVICE EVAL HEART: CPT | Performed by: NURSE PRACTITIONER

## 2024-01-23 PROCEDURE — 3078F DIAST BP <80 MM HG: CPT | Performed by: INTERNAL MEDICINE

## 2024-01-23 PROCEDURE — 99214 OFFICE O/P EST MOD 30 MIN: CPT | Performed by: INTERNAL MEDICINE

## 2024-01-23 PROCEDURE — 3074F SYST BP LT 130 MM HG: CPT | Performed by: INTERNAL MEDICINE

## 2024-01-23 RX ORDER — ATORVASTATIN CALCIUM 40 MG/1
40 TABLET, FILM COATED ORAL DAILY
Qty: 90 TABLET | Refills: 3 | Status: SHIPPED | OUTPATIENT
Start: 2024-01-23

## 2024-01-23 RX ORDER — FUROSEMIDE 20 MG/1
20 TABLET ORAL
Qty: 90 TABLET | Refills: 3
Start: 2024-01-23

## 2024-01-23 RX ORDER — BLOOD SUGAR DIAGNOSTIC
1 STRIP MISCELLANEOUS
Qty: 200 STRIP | Refills: 3 | Status: SHIPPED | OUTPATIENT
Start: 2024-01-23

## 2024-01-23 RX ORDER — EMPAGLIFLOZIN 10 MG/1
10 TABLET, FILM COATED ORAL DAILY
Qty: 90 TABLET | Refills: 3
Start: 2024-01-23

## 2024-01-23 ASSESSMENT — FIBROSIS 4 INDEX: FIB4 SCORE: 1.18

## 2024-01-23 ASSESSMENT — ENCOUNTER SYMPTOMS
CHILLS: 0
PALPITATIONS: 0
STRIDOR: 0
NAUSEA: 0
DIZZINESS: 0
SPEECH CHANGE: 0
FOCAL WEAKNESS: 0
TREMORS: 0
LOSS OF CONSCIOUSNESS: 0
COUGH: 0
VOMITING: 0
FEVER: 0
HEADACHES: 0
WEIGHT LOSS: 0
HEARTBURN: 0
SPUTUM PRODUCTION: 0
HEMOPTYSIS: 0
ORTHOPNEA: 0
SHORTNESS OF BREATH: 0
WHEEZING: 0
SENSORY CHANGE: 0
PND: 0
TINGLING: 0

## 2024-01-23 NOTE — PATIENT INSTRUCTIONS
We reviewed in person the most recent labs  Recent Results (from the past 5040 hour(s))   POCT A1C    Collection Time: 07/11/23  1:48 PM   Result Value Ref Range    Glycohemoglobin 8.8 (A) 4.2 - 5.8 %    Est Avg Glucose      Glycohemoglobin     MICROALBUMIN CREAT RATIO URINE    Collection Time: 07/19/23  8:44 AM   Result Value Ref Range    Microalbumin Urine Timed <9.2 0.0 - 30.0 mg/g    Microalbumin, Urine Random <0.7 <=1.9 mg/dL    Creatinine, Random Urine 76 Not Established mg/dL   COMP METABOLIC PANEL    Collection Time: 07/19/23  8:44 AM   Result Value Ref Range    Sodium 138 136 - 144 mmol/L    Potassium 4.5 3.6 - 5.1 mmol/L    Chloride 106 102 - 110 mmol/L    Co2 26 22 - 32 mmol/L    Alkaline Phosphatase 70 38 - 126 U/L    AST(SGOT) 76 (H) 15 - 41 U/L    ALT(SGPT) 112 (H) 14 - 54 U/L    Bun 20 8 - 20 mg/dL    Creatinine 0.75 0.60 - 1.10 mg/dL    Calcium 9.2 8.7 - 10.3 mg/dL    Total Protein 7.4 6.4 - 8.2 g/dL    Albumin 4.2 3.5 - 4.8 g/dL    Ag Ratio 1.3 1.0 - 2.2 ratio    Anion Gap 6 2 - 11 mmol/L    Glom Filt Rate, Est 78 >60 mL/min/1.7    Globulin 3.2 (H) 2.6 - 3.1 g/dL    Glucose 174 (H) 60 - 99 mg/dL    Total Bilirubin 0.7 0.4 - 2.0 mg/dL   HEPATITIS PANEL ACUTE (4 COMPONENTS)    Collection Time: 07/19/23  8:44 AM   Result Value Ref Range    Hep A Virus Antibody Total Reactive (A) Nonreactive    Hepatitis A Virus Ab, IgM Nonreactive Nonreactive    Hepatitis B Core Ab, Total Nonreactive Nonreactive    Hepatitis B Cors Ab,IgM Nonreactive Nonreactive    Hep B Surgace Ab, Qual Nonreactive     Hepatitis B Surface Antigen Nonreactive Nonreactive    Hepatitis C virus IgG Ab (Units/volume) in Serum by Immunoassay External Nonreactive Nonreactive   EKG    Collection Time: 08/29/23 10:58 AM   Result Value Ref Range    Report       [facility] [place] [facility]    Test Date:  2023-08-29  Pt Name:    TRINY ANAND                  Department: [facility]  MRN:        [id number]                      Room:  Gender:     Female                        Technician: OKSANA  :        1960                   Requested By:MARIELY FABIAN  Order #:    [id number]                    Reading MD: Mariely Fabian MD    Measurements  Intervals                                Axis  Rate:       80                           P:          50  NJ:         162                          QRS:        -72  QRSD:       129                          T:          56  QT:         411  QTc:        475    Interpretive Statements  Atrial-BIventricular dual-paced rhythm  No previous ECG available for comparison  Electronically Signed On 2023 17:32:18 PDT by Mariely Fabian MD     COMP METABOLIC PANEL    Collection Time: 10/19/23  8:20 PM   Result Value Ref Range    Sodium 139 136 - 144 mmol/L    Potassium 3.5 (L) 3.6 - 5.1 mmol/L    Chloride 105 102 - 110 mmol/L    Co2 27 22 - 32 mmol/L    Alkaline Phosphatase 76 38 - 126 U/L    AST(SGOT) 36 15 - 41 U/L    ALT(SGPT) 56 (H) 14 - 54 U/L    Bun 17 8 - 20 mg/dL    Creatinine 0.74 0.60 - 1.10 mg/dL    Calcium 9.8 8.7 - 10.3 mg/dL    Total Protein 7.7 6.4 - 8.2 g/dL    Albumin 4.3 3.5 - 4.8 g/dL    Ag Ratio 1.3 1.0 - 2.2 ratio    Anion Gap 7 2 - 11 mmol/L    Glom Filt Rate, Est 79 >60 mL/min/1.7    Globulin 3.4 (H) 2.6 - 3.1 g/dL    Glucose 130 (H) 60 - 99 mg/dL    Total Bilirubin 0.7 0.4 - 2.0 mg/dL   COMPLETE CBC & AUTO DIFF WBC    Collection Time: 10/19/23  8:20 PM   Result Value Ref Range    Leukocytes, Absolute 9.0 3.7 - 10.6 x10E3/uL    RBC 4.75 4.19 - 5.21 x10e6/uL    Hemoglobin 14.0 12.3 - 16.0 g/dL    Hematocrit 40.9 36.0 - 47.0 %    MCV 86.2 81.0 - 99.0 fL    MCH 29.5 28.0 - 33.8 pg    MCHC 34.2 33.1 - 36.5 g/dL    RDW 13.3 11.8 - 14.0 %    Platelet Count 255 146 - 390 x10E3/uL    MPV 8.6 6.4 - 10.2 fL    Neutrophils-Polys 58.8 41.7 - 82.3 %    Lymphocytes 30.5 10.8 - 44.4 %    Monocytes 6.8 5.0 - 12.8 %    Eosinophils 3.3 0.0 - 6.6 %    Basophils 0.6 0.0 - 1.3 %    Neutrophils (Absolute) 5.30 1.40 - 8.00  x10E3/uL    Lymphs (Absolute) 2.70 1.00 - 5.20 x10E3/uL    Monos (Absolute) 0.60 0.16 - 1.00 x10E3/uL    Eos (Absolute) 0.30 0.00 - 0.80 x10E3/uL    Baso (Absolute) 0.10 0.00 - 0.30 x10E3/uL   COMP METABOLIC PANEL    Collection Time: 10/22/23  7:11 AM   Result Value Ref Range    Sodium 138 136 - 144 mmol/L    Potassium 3.8 3.6 - 5.1 mmol/L    Chloride 103 102 - 110 mmol/L    Co2 25 22 - 32 mmol/L    Alkaline Phosphatase 79 38 - 126 U/L    AST(SGOT) 42 (H) 15 - 41 U/L    ALT(SGPT) 69 (H) 14 - 54 U/L    Bun 25 (H) 8 - 20 mg/dL    Creatinine 0.91 0.60 - 1.10 mg/dL    Calcium 10.4 (H) 8.7 - 10.3 mg/dL    Total Protein 8.1 6.4 - 8.2 g/dL    Albumin 4.5 3.5 - 4.8 g/dL    Ag Ratio 1.3 1.0 - 2.2 ratio    Anion Gap 10 2 - 11 mmol/L    Glom Filt Rate, Est 62 >60 mL/min/1.7    Globulin 3.6 (H) 2.6 - 3.1 g/dL    Glucose 162 (H) 60 - 99 mg/dL    Total Bilirubin 0.8 0.4 - 2.0 mg/dL   COMPLETE CBC & AUTO DIFF WBC    Collection Time: 10/22/23  7:11 AM   Result Value Ref Range    Leukocytes, Absolute 8.5 3.7 - 10.6 x10E3/uL    RBC 5.10 4.19 - 5.21 x10e6/uL    Hemoglobin 15.0 12.3 - 16.0 g/dL    Hematocrit 44.2 36.0 - 47.0 %    MCV 86.5 81.0 - 99.0 fL    MCH 29.5 28.0 - 33.8 pg    MCHC 34.0 33.1 - 36.5 g/dL    RDW 13.1 11.8 - 14.0 %    Platelet Count 279 146 - 390 x10E3/uL    MPV 8.8 6.4 - 10.2 fL    Neutrophils-Polys 63.9 41.7 - 82.3 %    Lymphocytes 24.5 10.8 - 44.4 %    Monocytes 7.8 5.0 - 12.8 %    Eosinophils 2.6 0.0 - 6.6 %    Basophils 1.2 0.0 - 1.3 %    Neutrophils (Absolute) 5.40 1.40 - 8.00 x10E3/uL    Lymphs (Absolute) 2.10 1.00 - 5.20 x10E3/uL    Monos (Absolute) 0.70 0.16 - 1.00 x10E3/uL    Eos (Absolute) 0.20 0.00 - 0.80 x10E3/uL    Baso (Absolute) 0.10 0.00 - 0.30 x10E3/uL   EC-ECHOCARDIOGRAM COMPLETE W/O CONT    Collection Time: 10/24/23  1:46 PM   Result Value Ref Range    Eject.Frac. MOD BP 62.31     Eject.Frac. MOD 4C 65.05     Eject.Frac. MOD 2C 63.01     Left Ventrical Ejection Fraction 60    EKG - Clinic  Performed    Collection Time: 23 10:22 AM   Result Value Ref Range    Report       [facility] [place]    Test Date:  2023  Pt Name:    TRINY ANAND                  Department: [facility]  MRN:        [id number]                      Room:  Gender:     Female                       Technician: SHIRA  :        1960                   Requested By:JASEN RAMÍREZ  Order #:    [id number]                    Reading MD: Jasen Ramírez MD    Measurements  Intervals                                Axis  Rate:       66                           P:          0  UT:         200                          QRS:        0  QRSD:       143                          T:          0  QT:         526  QTc:        552    Interpretive Statements  Incomplete analysis due to missing data in precordial lead(s)  Atrial-sensed ventricular-paced rhythm  No further analysis attempted due to paced rhythm  Baseline wander in lead(s) I,III,aVL  Missing lead(s): V1  Compared to ECG 2023 10:58:57  No significant changes  Electronically Signed On 2023 11:42:30 PST by Jasen Ramírez MD     HEMOGLOBIN A1C    Collection Time: 23 10:38 AM   Result Value Ref Range    Glycohemoglobin 6.1 (H) 4.0 - 5.6 %    Est Avg Glucose 128 mg/dL   TSH    Collection Time: 23 10:38 AM   Result Value Ref Range    TSH 1.150 0.380 - 5.330 uIU/mL   FREE THYROXINE    Collection Time: 23 10:38 AM   Result Value Ref Range    Free T-4 1.30 0.93 - 1.70 ng/dL   POCT CEPHEID COV-2, FLU A/B, RSV - PCR    Collection Time: 23 10:32 AM   Result Value Ref Range    SARS-CoV-2 by PCR Positive (A) Negative, Invalid    Influenza virus A RNA Negative Negative, Invalid    Influenza virus B, PCR Negative Negative, Invalid    RSV, PCR Negative Negative, Invalid   POCT CEPHEID GROUP A STREP - PCR    Collection Time: 23 11:05 AM   Result Value Ref Range    POC Group A Strep, PCR Not Detected Not Detected, Invalid   Cytology    Collection Time: 23  2:23 PM    Result Value Ref Range    Cytology Reg See Path Report

## 2024-01-23 NOTE — PROGRESS NOTES
Chief Complaint   Patient presents with    Atrial Fibrillation     F/V Dx: Paroxysmal A-fib (HCC)       Subjective     Bruna Nair is a 63 y.o. female who presents today for history of A-fib ablations and BiV pacemaker she describes herself as being pacer dependent her records are from [place] she lives in [place] and like to get established at [facility] she will be going on Medicare     Had lower GIB    Pacer check shows inappropriate ATP for     Moving to [place]     1/23/2024   [facility] DEVICE FLOWSHEET    Device History: Atrial Fibrillation;Congestive Heart Failure;Third Degree AV Block    Device Type: C R T - D    Mode: D D D R    Rate: 60    Configuration: 1 Zone    Presenting Rhythm: AsVp    Atrially Paced: (%) 57 %    Ventricularly Paced: (%) 100 %    Device Therapy Details: ATPx 2 in nov secondary to RA lead noise. One ATP event escalted to AF/AFL for approx 9 min    Mode Switching Details: see above    Atrial Lead Threshold: (Volts) 0.75 Volts    Atrial Lead Sensing: (mV) 3.8 mV    Atrial Lead Impedance: (Ohms) 418 Ohms    Right Ventricular Lead Threshold: (Volts) 1.75 Volts    Right Ventricular Lead Sensing: (mV) 3.8 mV    Right Ventricular Lead Impedance: (Ohms) 456 Ohms    Left Ventricular Lead Threshold: (Volts) 0.75 Volts    Left Ventricular Lead Impedance: (Ohms) 608 Ohms    Battery Estimated Longevity: 10 months (6 minimum)    Changes Made: AT/AF therapies turned off.          Past Medical History:   Diagnosis Date    Anticoagulated     AV block, complete (HCC)     Diabetes mellitus (HCC)     Hyperlipidemia     Hypertension     EZEQUIEL on CPAP     Persistent atrial fibrillation (HCC) - s/p ablations 10/2023    Echocardiogram with normal LV size, mild concentric LVH, LVEF 60%. Normal RV. Enlarged RA, moderately dilated LA. Mild MR, trace TR. RVSP 24mmHg.     Past Surgical History:   Procedure Laterality Date    PACEMAKER INSERTION Left 05/16/2017    Upgrade to Medtronic Viva CRT-P C6TR01;  original PM implanted in 2007.    APPENDECTOMY      HYSTERECTOMY, TOTAL ABDOMINAL       Family History   Problem Relation Age of Onset    Ovarian Cancer Mother     Breast Cancer Maternal Grandmother     Arrythmia Neg Hx     Heart Disease Neg Hx      Social History     Socioeconomic History    Marital status:      Spouse name: Not on file    Number of children: Not on file    Years of education: Not on file    Highest education level: Some college, no degree   Occupational History    Not on file   Tobacco Use    Smoking status: Never    Smokeless tobacco: Never   Vaping Use    Vaping Use: Not on file   Substance and Sexual Activity    Alcohol use: Not Currently    Drug use: Never    Sexual activity: Not on file   Other Topics Concern    Not on file   Social History Narrative    Not on file     Social Determinants of Health     Financial Resource Strain: High Risk (11/8/2023)    Overall Financial Resource Strain (CARDIA)     Difficulty of Paying Living Expenses: Hard   Food Insecurity: Food Insecurity Present (11/8/2023)    Hunger Vital Sign     Worried About Running Out of Food in the Last Year: Sometimes true     Ran Out of Food in the Last Year: Never true   Transportation Needs: No Transportation Needs (11/8/2023)    PRAPARE - Transportation     Lack of Transportation (Medical): No     Lack of Transportation (Non-Medical): No   Physical Activity: Insufficiently Active (11/8/2023)    Exercise Vital Sign     Days of Exercise per Week: 3 days     Minutes of Exercise per Session: 30 min   Stress: Stress Concern Present (11/8/2023)    [place] [place] [employer] - Occupational Stress Questionnaire     Feeling of Stress : To some extent   Social Connections: Moderately Integrated (11/8/2023)    Social Connection and Isolation Panel [NHANES]     Frequency of Communication with Friends and Family: More than three times a week     Frequency of Social Gatherings with Friends and Family: Twice a week      Attends Denominational Services: More than 4 times per year     Active Member of Clubs or Organizations: Yes     Attends Club or Organization Meetings: More than 4 times per year     Marital Status:    Intimate Partner Violence: Not on file   Housing Stability: High Risk (11/8/2023)    Housing Stability Vital Sign     Unable to Pay for Housing in the Last Year: Yes     Number of Places Lived in the Last Year: Not on file     Unstable Housing in the Last Year: No     Allergies   Allergen Reactions    Codeine      Other Reaction(s): .Unknown    Metformin Nausea     Severe nausea    Sulfa Drugs Itching     Other Reaction(s): Itching    Hydrocodone Nausea     Outpatient Encounter Medications as of 1/23/2024   Medication Sig Dispense Refill    apixaban (ELIQUIS) 5mg Tab Take 1 Tablet by mouth 2 times a day. 180 Tablet 3    atorvastatin (LIPITOR) 40 MG Tab Take 1 Tablet by mouth every day. 90 Tablet 3    glucose blood (ONETOUCH VERIO) strip 1 Strip by Other route 2 times daily with meals as needed (blood sugar testing). 200 Strip 3    Empagliflozin (JARDIANCE) 10 MG Tab tablet Take 1 Tablet by mouth every day. 90 Tablet 3    furosemide (LASIX) 20 MG Tab Take 1 Tablet by mouth 1 time a day as needed (swelling). 90 Tablet 3    vitamin D3 (CHOLECALCIFEROL) 1000 Unit (25 mcg) Tab Take 1,000 Units by mouth every day.      Prenatal Vit-DSS-Fe Cbn-FA (PRENATAL AD PO) Take  by mouth.      potassium chloride SA (KDUR) 20 MEQ Tab CR Take 1 Tablet by mouth 2 times a day. 60 Tablet 6    [DISCONTINUED] lidocaine (XYLOCAINE) 2 % Solution Take 5 mL by mouth every four hours as needed for Throat/Mouth Pain. 100 mL 0    [DISCONTINUED] amitriptyline (ELAVIL) 25 MG Tab Take 1 Tablet by mouth every evening. 30 Tablet 1    [DISCONTINUED] Empagliflozin (JARDIANCE) 10 MG Tab tablet Take 10 mg by mouth every day.      [DISCONTINUED] furosemide (LASIX) 20 MG Tab Take 20 mg by mouth every day.      [DISCONTINUED] rivaroxaban (XARELTO) 20 MG Tab  "tablet Take 1 Tablet by mouth with dinner. 90 Tablet 3    [DISCONTINUED] atorvastatin (LIPITOR) 40 MG Tab        No facility-administered encounter medications on file as of 1/23/2024.     ROS           Objective     /62 (BP Location: Left arm, Patient Position: Sitting, BP Cuff Size: Adult)   Pulse 86   Resp 16   Ht 1.626 m (5' 4\")   Wt 93.4 kg (206 lb)   SpO2 95%   BMI 35.36 kg/m²     Physical Exam         I personally reviewed in person with the patient her most recent pacemaker check it is functioning appropriately.    We reviewed in person the most recent labs  Recent Results (from the past 5040 hour(s))   POCT A1C    Collection Time: 07/11/23  1:48 PM   Result Value Ref Range    Glycohemoglobin 8.8 (A) 4.2 - 5.8 %    Est Avg Glucose      Glycohemoglobin     MICROALBUMIN CREAT RATIO URINE    Collection Time: 07/19/23  8:44 AM   Result Value Ref Range    Microalbumin Urine Timed <9.2 0.0 - 30.0 mg/g    Microalbumin, Urine Random <0.7 <=1.9 mg/dL    Creatinine, Random Urine 76 Not Established mg/dL   COMP METABOLIC PANEL    Collection Time: 07/19/23  8:44 AM   Result Value Ref Range    Sodium 138 136 - 144 mmol/L    Potassium 4.5 3.6 - 5.1 mmol/L    Chloride 106 102 - 110 mmol/L    Co2 26 22 - 32 mmol/L    Alkaline Phosphatase 70 38 - 126 U/L    AST(SGOT) 76 (H) 15 - 41 U/L    ALT(SGPT) 112 (H) 14 - 54 U/L    Bun 20 8 - 20 mg/dL    Creatinine 0.75 0.60 - 1.10 mg/dL    Calcium 9.2 8.7 - 10.3 mg/dL    Total Protein 7.4 6.4 - 8.2 g/dL    Albumin 4.2 3.5 - 4.8 g/dL    Ag Ratio 1.3 1.0 - 2.2 ratio    Anion Gap 6 2 - 11 mmol/L    Glom Filt Rate, Est 78 >60 mL/min/1.7    Globulin 3.2 (H) 2.6 - 3.1 g/dL    Glucose 174 (H) 60 - 99 mg/dL    Total Bilirubin 0.7 0.4 - 2.0 mg/dL   HEPATITIS PANEL ACUTE (4 COMPONENTS)    Collection Time: 07/19/23  8:44 AM   Result Value Ref Range    Hep A Virus Antibody Total Reactive (A) Nonreactive    Hepatitis A Virus Ab, IgM Nonreactive Nonreactive    Hepatitis B Core Ab, Total " Nonreactive Nonreactive    Hepatitis B Cors Ab,IgM Nonreactive Nonreactive    Hep B Surgace Ab, Qual Nonreactive     Hepatitis B Surface Antigen Nonreactive Nonreactive    Hepatitis C virus IgG Ab (Units/volume) in Serum by Immunoassay External Nonreactive Nonreactive   EKG    Collection Time: 23 10:58 AM   Result Value Ref Range    Report       [facility]    Test Date:  2023  Pt Name:    TRINY ANAND                  Department: [facility]  MRN:        [id number]                      Room:  Gender:     Female                       Technician: OKSANA  :        1960                   Requested By:MARIELY FABIAN  Order #:    [id number]                    Reading MD: Mariely Fabian MD    Measurements  Intervals                                Axis  Rate:       80                           P:          50  ID:         162                          QRS:        -72  QRSD:       129                          T:          56  QT:         411  QTc:        475    Interpretive Statements  Atrial-BIventricular dual-paced rhythm  No previous ECG available for comparison  Electronically Signed On 2023 17:32:18 PDT by Mariely Fabian MD     COMP METABOLIC PANEL    Collection Time: 10/19/23  8:20 PM   Result Value Ref Range    Sodium 139 136 - 144 mmol/L    Potassium 3.5 (L) 3.6 - 5.1 mmol/L    Chloride 105 102 - 110 mmol/L    Co2 27 22 - 32 mmol/L    Alkaline Phosphatase 76 38 - 126 U/L    AST(SGOT) 36 15 - 41 U/L    ALT(SGPT) 56 (H) 14 - 54 U/L    Bun 17 8 - 20 mg/dL    Creatinine 0.74 0.60 - 1.10 mg/dL    Calcium 9.8 8.7 - 10.3 mg/dL    Total Protein 7.7 6.4 - 8.2 g/dL    Albumin 4.3 3.5 - 4.8 g/dL    Ag Ratio 1.3 1.0 - 2.2 ratio    Anion Gap 7 2 - 11 mmol/L    Glom Filt Rate, Est 79 >60 mL/min/1.7    Globulin 3.4 (H) 2.6 - 3.1 g/dL    Glucose 130 (H) 60 - 99 mg/dL    Total Bilirubin 0.7 0.4 - 2.0 mg/dL   COMPLETE CBC & AUTO DIFF WBC    Collection Time: 10/19/23  8:20 PM   Result Value Ref  Range    Leukocytes, Absolute 9.0 3.7 - 10.6 x10E3/uL    RBC 4.75 4.19 - 5.21 x10e6/uL    Hemoglobin 14.0 12.3 - 16.0 g/dL    Hematocrit 40.9 36.0 - 47.0 %    MCV 86.2 81.0 - 99.0 fL    MCH 29.5 28.0 - 33.8 pg    MCHC 34.2 33.1 - 36.5 g/dL    RDW 13.3 11.8 - 14.0 %    Platelet Count 255 146 - 390 x10E3/uL    MPV 8.6 6.4 - 10.2 fL    Neutrophils-Polys 58.8 41.7 - 82.3 %    Lymphocytes 30.5 10.8 - 44.4 %    Monocytes 6.8 5.0 - 12.8 %    Eosinophils 3.3 0.0 - 6.6 %    Basophils 0.6 0.0 - 1.3 %    Neutrophils (Absolute) 5.30 1.40 - 8.00 x10E3/uL    Lymphs (Absolute) 2.70 1.00 - 5.20 x10E3/uL    Monos (Absolute) 0.60 0.16 - 1.00 x10E3/uL    Eos (Absolute) 0.30 0.00 - 0.80 x10E3/uL    Baso (Absolute) 0.10 0.00 - 0.30 x10E3/uL   COMP METABOLIC PANEL    Collection Time: 10/22/23  7:11 AM   Result Value Ref Range    Sodium 138 136 - 144 mmol/L    Potassium 3.8 3.6 - 5.1 mmol/L    Chloride 103 102 - 110 mmol/L    Co2 25 22 - 32 mmol/L    Alkaline Phosphatase 79 38 - 126 U/L    AST(SGOT) 42 (H) 15 - 41 U/L    ALT(SGPT) 69 (H) 14 - 54 U/L    Bun 25 (H) 8 - 20 mg/dL    Creatinine 0.91 0.60 - 1.10 mg/dL    Calcium 10.4 (H) 8.7 - 10.3 mg/dL    Total Protein 8.1 6.4 - 8.2 g/dL    Albumin 4.5 3.5 - 4.8 g/dL    Ag Ratio 1.3 1.0 - 2.2 ratio    Anion Gap 10 2 - 11 mmol/L    Glom Filt Rate, Est 62 >60 mL/min/1.7    Globulin 3.6 (H) 2.6 - 3.1 g/dL    Glucose 162 (H) 60 - 99 mg/dL    Total Bilirubin 0.8 0.4 - 2.0 mg/dL   COMPLETE CBC & AUTO DIFF WBC    Collection Time: 10/22/23  7:11 AM   Result Value Ref Range    Leukocytes, Absolute 8.5 3.7 - 10.6 x10E3/uL    RBC 5.10 4.19 - 5.21 x10e6/uL    Hemoglobin 15.0 12.3 - 16.0 g/dL    Hematocrit 44.2 36.0 - 47.0 %    MCV 86.5 81.0 - 99.0 fL    MCH 29.5 28.0 - 33.8 pg    MCHC 34.0 33.1 - 36.5 g/dL    RDW 13.1 11.8 - 14.0 %    Platelet Count 279 146 - 390 x10E3/uL    MPV 8.8 6.4 - 10.2 fL    Neutrophils-Polys 63.9 41.7 - 82.3 %    Lymphocytes 24.5 10.8 - 44.4 %    Monocytes 7.8 5.0 - 12.8 %     Eosinophils 2.6 0.0 - 6.6 %    Basophils 1.2 0.0 - 1.3 %    Neutrophils (Absolute) 5.40 1.40 - 8.00 x10E3/uL    Lymphs (Absolute) 2.10 1.00 - 5.20 x10E3/uL    Monos (Absolute) 0.70 0.16 - 1.00 x10E3/uL    Eos (Absolute) 0.20 0.00 - 0.80 x10E3/uL    Baso (Absolute) 0.10 0.00 - 0.30 x10E3/uL   EC-ECHOCARDIOGRAM COMPLETE W/O CONT    Collection Time: 10/24/23  1:46 PM   Result Value Ref Range    Eject.Frac. MOD BP 62.31     Eject.Frac. MOD 4C 65.05     Eject.Frac. MOD 2C 63.01     Left Ventrical Ejection Fraction 60    EKG - Clinic Performed    Collection Time: 23 10:22 AM   Result Value Ref Range    Report       [facility]    Test Date:  2023  Pt Name:    TRINY ANAND                  Department: [facility]  MRN:        [id number]                      Room:  Gender:     Female                       Technician: SHIRA  :        1960                   Requested By:JASEN RAMÍREZ  Order #:    [id number]                    Reading MD: Jasen Ramírez MD    Measurements  Intervals                                Axis  Rate:       66                           P:          0  MI:         200                          QRS:        0  QRSD:       143                          T:          0  QT:         526  QTc:        552    Interpretive Statements  Incomplete analysis due to missing data in precordial lead(s)  Atrial-sensed ventricular-paced rhythm  No further analysis attempted due to paced rhythm  Baseline wander in lead(s) I,III,aVL  Missing lead(s): V1  Compared to ECG 2023 10:58:57  No significant changes  Electronically Signed On 2023 11:42:30 PST by Jasen Ramírez MD     HEMOGLOBIN A1C    Collection Time: 23 10:38 AM   Result Value Ref Range    Glycohemoglobin 6.1 (H) 4.0 - 5.6 %    Est Avg Glucose 128 mg/dL   TSH    Collection Time: 23 10:38 AM   Result Value Ref Range    TSH 1.150 0.380 - 5.330 uIU/mL   FREE THYROXINE    Collection Time: 23 10:38 AM   Result Value Ref Range    Free  T-4 1.30 0.93 - 1.70 ng/dL   POCT CEPHEID COV-2, FLU A/B, RSV - PCR    Collection Time: 12/09/23 10:32 AM   Result Value Ref Range    SARS-CoV-2 by PCR Positive (A) Negative, Invalid    Influenza virus A RNA Negative Negative, Invalid    Influenza virus B, PCR Negative Negative, Invalid    RSV, PCR Negative Negative, Invalid   POCT CEPHEID GROUP A STREP - PCR    Collection Time: 12/09/23 11:05 AM   Result Value Ref Range    POC Group A Strep, PCR Not Detected Not Detected, Invalid   Cytology    Collection Time: 12/19/23  2:23 PM   Result Value Ref Range    Cytology Reg See Path Report          Assessment & Plan     1. Pacemaker - Medtronic BIV- P        2. Persistent atrial fibrillation (HCC) - s/p ablations  apixaban (ELIQUIS) 5mg Tab      3. On potassium wasting diuretic therapy        4. Chronic heart failure with preserved ejection fraction (HCC)  Empagliflozin (JARDIANCE) 10 MG Tab tablet    furosemide (LASIX) 20 MG Tab      5. Type 2 diabetes mellitus without complication, without long-term current use of insulin ([facility])  glucose blood (ONETOUCH VERIO) strip      6. Dyslipidemia  atorvastatin (LIPITOR) 40 MG Tab      7. Hypercoagulable state due to persistent atrial fibrillation (HCC)  apixaban (ELIQUIS) 5mg Tab          Medical Decision Making: Today's Assessment/Status/Plan:      It was my pleasure to meet with Ms. Nair.    We addressed the management of hypertension at today's visit. Blood pressure is well controlled.  We specifically assessed the labs on hypertension treatment    We addressed the management of dyslipidemia and atherosclerosis at today's visit. She is on appropriate statin.    We addressed the management of atrial fibrillation.  She is on proper anticoagulation cholesterol management and rate or rhythm control as appropriate.  We addressed the potential side effects and laboratory follow-up for these medications.    We addressed the management of chronic anticoagulation at today's visit. She  understands the risks and benefits of chronic anticoagulation.  We reviewed and verified the results of annual testing for anemia and kidney function.  SWITCHED TO ELIQUIS      We addressed the management of congestive heart failure.  She is on proper mineralacorticoid, angiotensin/ace inhibition with neprilysin inhibition, SGLTs inhibitor and beta-blockers as appropriate.  We addressed the potential side effects and regular laboratory follow-up for these medications.      I will see Ms. Nair back in 1 year time and encouraged her to follow up with us over the phone or electronically using my MyChart as issues arise.    It is my pleasure to participate in the care of Ms. Nair.  Please do not hesitate to contact me with questions or concerns.    Pierce Holt MD PhD [facility]  Cardiologist [facility]    Please note that this dictation was created using voice recognition software. There may be errors I did not discover before finalizing the note.

## 2024-01-23 NOTE — PROGRESS NOTES
No chief complaint on file.      Subjective     Bruna Nair is a 63 y.o. female who presents today     Past Medical History:   Diagnosis Date    Anticoagulated     AV block, complete (HCC)     Diabetes mellitus (HCC)     Hyperlipidemia     Hypertension     EZEQUIEL on CPAP     Persistent atrial fibrillation (HCC) - s/p ablations 10/2023    Echocardiogram with normal LV size, mild concentric LVH, LVEF 60%. Normal RV. Enlarged RA, moderately dilated LA. Mild MR, trace TR. RVSP 24mmHg.     Past Surgical History:   Procedure Laterality Date    PACEMAKER INSERTION Left 05/16/2017    Upgrade to Medtronic Viva CRT-P C6TR01; original PM implanted in 2007.    APPENDECTOMY      HYSTERECTOMY, TOTAL ABDOMINAL       Family History   Problem Relation Age of Onset    Ovarian Cancer Mother     Breast Cancer Maternal Grandmother     Arrythmia Neg Hx     Heart Disease Neg Hx      Social History     Socioeconomic History    Marital status:      Spouse name: Not on file    Number of children: Not on file    Years of education: Not on file    Highest education level: Some college, no degree   Occupational History    Not on file   Tobacco Use    Smoking status: Never    Smokeless tobacco: Never   Vaping Use    Vaping Use: Not on file   Substance and Sexual Activity    Alcohol use: Not Currently    Drug use: Never    Sexual activity: Not on file   Other Topics Concern    Not on file   Social History Narrative    Not on file     Social Determinants of Health     Financial Resource Strain: High Risk (11/8/2023)    Overall Financial Resource Strain (CARDIA)     Difficulty of Paying Living Expenses: Hard   Food Insecurity: Food Insecurity Present (11/8/2023)    Hunger Vital Sign     Worried About Running Out of Food in the Last Year: Sometimes true     Ran Out of Food in the Last Year: Never true   Transportation Needs: No Transportation Needs (11/8/2023)    PRAPARE - Transportation     Lack of Transportation (Medical): No     Lack of  Transportation (Non-Medical): No   Physical Activity: Insufficiently Active (11/8/2023)    Exercise Vital Sign     Days of Exercise per Week: 3 days     Minutes of Exercise per Session: 30 min   Stress: Stress Concern Present (11/8/2023)    [place] [employer] - Occupational Stress Questionnaire     Feeling of Stress : To some extent   Social Connections: Moderately Integrated (11/8/2023)    Social Connection and Isolation Panel [NHANES]     Frequency of Communication with Friends and Family: More than three times a week     Frequency of Social Gatherings with Friends and Family: Twice a week     Attends Nondenominational Services: More than 4 times per year     Active Member of Clubs or Organizations: Yes     Attends Club or Organization Meetings: More than 4 times per year     Marital Status:    Intimate Partner Violence: Not on file   Housing Stability: High Risk (11/8/2023)    Housing Stability Vital Sign     Unable to Pay for Housing in the Last Year: Yes     Number of Places Lived in the Last Year: Not on file     Unstable Housing in the Last Year: No     Allergies   Allergen Reactions    Codeine      Other Reaction(s): .Unknown    Metformin Nausea     Severe nausea    Sulfa Drugs Itching     Other Reaction(s): Itching    Hydrocodone Nausea     Outpatient Encounter Medications as of 1/23/2024   Medication Sig Dispense Refill    lidocaine (XYLOCAINE) 2 % Solution Take 5 mL by mouth every four hours as needed for Throat/Mouth Pain. 100 mL 0    amitriptyline (ELAVIL) 25 MG Tab Take 1 Tablet by mouth every evening. 30 Tablet 1    Empagliflozin (JARDIANCE) 10 MG Tab tablet Take 10 mg by mouth every day.      furosemide (LASIX) 20 MG Tab Take 20 mg by mouth every day.      vitamin D3 (CHOLECALCIFEROL) 1000 Unit (25 mcg) Tab Take 1,000 Units by mouth every day.      Prenatal Vit-DSS-Fe Cbn-FA (PRENATAL AD PO) Take  by mouth.      potassium chloride SA (KDUR) 20 MEQ Tab CR Take 1 Tablet by mouth 2  times a day. (Patient not taking: Reported on 12/9/2023) 60 Tablet 6    rivaroxaban (XARELTO) 20 MG Tab tablet Take 1 Tablet by mouth with dinner. 90 Tablet 3    atorvastatin (LIPITOR) 40 MG Tab        No facility-administered encounter medications on file as of 1/23/2024.     Review of Systems   Constitutional:  Negative for chills, fever, malaise/fatigue and weight loss.   HENT:  Negative for congestion and tinnitus.    Respiratory:  Negative for cough, hemoptysis, sputum production, shortness of breath, wheezing and stridor.    Cardiovascular:  Negative for chest pain, palpitations, orthopnea, leg swelling and PND.   Gastrointestinal:  Negative for heartburn, nausea and vomiting.   Neurological:  Negative for dizziness, tingling, tremors, sensory change, speech change, focal weakness, loss of consciousness and headaches.   All other systems reviewed and are negative.             Objective     There were no vitals taken for this visit.    Physical Exam  Constitutional:       Appearance: Normal appearance.   HENT:      Head: Normocephalic and atraumatic.      Mouth/Throat:      Mouth: Mucous membranes are moist.      Pharynx: Oropharynx is clear.   Eyes:      Extraocular Movements: Extraocular movements intact.      Conjunctiva/sclera: Conjunctivae normal.      Pupils: Pupils are equal, round, and reactive to light.   Neck:      Comments: Unable to eval for JVD  Cardiovascular:      Rate and Rhythm: Normal rate and regular rhythm.      Pulses: Normal pulses.   Pulmonary:      Effort: Pulmonary effort is normal.      Breath sounds: Normal breath sounds. No wheezing, rhonchi or rales.   Musculoskeletal:      Cervical back: Normal range of motion and neck supple.      Right lower leg: No edema.      Left lower leg: No edema.   Skin:     General: Skin is warm and dry.      Capillary Refill: Capillary refill takes 2 to 3 seconds.   Neurological:      Mental Status: She is alert and oriented to person, place, and time.    Psychiatric:         Mood and Affect: Mood normal.         Behavior: Behavior normal.         Thought Content: Thought content normal.         Judgment: Judgment normal.                Assessment & Plan     No diagnosis found.    Medical Decision Making: Today's Assessment/Status/Plan:

## 2024-01-30 ENCOUNTER — NON-PROVIDER VISIT (OUTPATIENT)
Dept: CARDIOLOGY | Facility: MEDICAL CENTER | Age: 64
End: 2024-01-30
Payer: MEDICARE

## 2024-01-30 PROCEDURE — 93294 REM INTERROG EVL PM/LDLS PM: CPT | Performed by: INTERNAL MEDICINE

## 2024-01-30 NOTE — CARDIAC REMOTE MONITOR - SCAN
Device transmission reviewed. Device demonstrated appropriate function.       Electronically Signed by: Jet Jang M.D.    2/8/2024  8:25 AM

## 2024-01-31 ENCOUNTER — TELEPHONE (OUTPATIENT)
Dept: HEALTH INFORMATION MANAGEMENT | Facility: OTHER | Age: 64
End: 2024-01-31
Payer: MEDICARE

## 2024-02-06 ENCOUNTER — PATIENT MESSAGE (OUTPATIENT)
Dept: CARDIOLOGY | Facility: MEDICAL CENTER | Age: 64
End: 2024-02-06
Payer: MEDICARE

## 2024-02-06 DIAGNOSIS — I42.9 CARDIOMYOPATHY, UNSPECIFIED TYPE (HCC): ICD-10-CM

## 2024-02-06 DIAGNOSIS — Z95.0 PACEMAKER: ICD-10-CM

## 2024-02-06 DIAGNOSIS — I48.0 PAROXYSMAL A-FIB (HCC): ICD-10-CM

## 2024-02-06 DIAGNOSIS — I50.30 HEART FAILURE WITH PRESERVED EJECTION FRACTION, UNSPECIFIED HF CHRONICITY (HCC): ICD-10-CM

## 2024-02-06 DIAGNOSIS — I10 ESSENTIAL HYPERTENSION, BENIGN: ICD-10-CM

## 2024-02-06 DIAGNOSIS — E78.5 DYSLIPIDEMIA: ICD-10-CM

## 2024-02-06 DIAGNOSIS — I50.32 CHRONIC HEART FAILURE WITH PRESERVED EJECTION FRACTION (HCC): ICD-10-CM

## 2024-02-06 DIAGNOSIS — D68.318 CIRCULATING ANTICOAGULANTS (HCC): ICD-10-CM

## 2024-02-06 DIAGNOSIS — D68.69 HYPERCOAGULABLE STATE DUE TO PERSISTENT ATRIAL FIBRILLATION (HCC): ICD-10-CM

## 2024-02-06 DIAGNOSIS — Z79.899 ON POTASSIUM WASTING DIURETIC THERAPY: ICD-10-CM

## 2024-02-06 DIAGNOSIS — R06.02 SHORTNESS OF BREATH: ICD-10-CM

## 2024-02-06 DIAGNOSIS — I48.19 HYPERCOAGULABLE STATE DUE TO PERSISTENT ATRIAL FIBRILLATION (HCC): ICD-10-CM

## 2024-02-06 DIAGNOSIS — I44.2 AV BLOCK, COMPLETE (HCC): ICD-10-CM

## 2024-02-06 DIAGNOSIS — I48.19 PERSISTENT ATRIAL FIBRILLATION (HCC): ICD-10-CM

## 2024-02-15 ENCOUNTER — PATIENT OUTREACH (OUTPATIENT)
Dept: HEALTH INFORMATION MANAGEMENT | Facility: OTHER | Age: 64
End: 2024-02-15
Payer: MEDICARE

## 2024-02-15 ENCOUNTER — PATIENT MESSAGE (OUTPATIENT)
Dept: HEALTH INFORMATION MANAGEMENT | Facility: OTHER | Age: 64
End: 2024-02-15

## 2024-03-18 ENCOUNTER — PATIENT OUTREACH (OUTPATIENT)
Dept: HEALTH INFORMATION MANAGEMENT | Facility: OTHER | Age: 64
End: 2024-03-18
Payer: MEDICARE

## 2024-03-18 DIAGNOSIS — I50.30 HEART FAILURE WITH PRESERVED EJECTION FRACTION, UNSPECIFIED HF CHRONICITY (HCC): ICD-10-CM

## 2024-03-18 DIAGNOSIS — I10 ESSENTIAL HYPERTENSION, BENIGN: ICD-10-CM

## 2024-03-20 ENCOUNTER — NON-PROVIDER VISIT (OUTPATIENT)
Dept: CARDIOLOGY | Facility: PHYSICIAN GROUP | Age: 64
End: 2024-03-20
Payer: MEDICARE

## 2024-03-20 VITALS
RESPIRATION RATE: 12 BRPM | BODY MASS INDEX: 34.21 KG/M2 | HEART RATE: 93 BPM | SYSTOLIC BLOOD PRESSURE: 98 MMHG | WEIGHT: 200.4 LBS | DIASTOLIC BLOOD PRESSURE: 62 MMHG | OXYGEN SATURATION: 94 % | HEIGHT: 64 IN

## 2024-03-20 DIAGNOSIS — I44.2 AV BLOCK, COMPLETE (HCC): ICD-10-CM

## 2024-03-20 DIAGNOSIS — Z95.0 PACEMAKER: ICD-10-CM

## 2024-03-20 DIAGNOSIS — I50.30 HEART FAILURE WITH PRESERVED EJECTION FRACTION, UNSPECIFIED HF CHRONICITY (HCC): ICD-10-CM

## 2024-03-20 DIAGNOSIS — I48.19 PERSISTENT ATRIAL FIBRILLATION (HCC): Chronic | ICD-10-CM

## 2024-03-20 PROCEDURE — 93288 INTERROG EVL PM/LDLS PM IP: CPT | Performed by: NURSE PRACTITIONER

## 2024-03-20 ASSESSMENT — FIBROSIS 4 INDEX: FIB4 SCORE: 1.18

## 2024-03-20 NOTE — PROGRESS NOTES
We are checking PM, as battery is nearing ROBINA.    She is relocating to [place], [place] in early April 2024, and does already have a follow-up appointment with cardiology there.    Device is working normally. 2 mode switching episodes (longest episode 38 seconds); she is anticoagulated with Eliquis.  Stable sensing and capture of RA, RV and LV leads; stable impedances. Battery longevity is 9 months (<5-12 months).  No changes are made today.    Urged to follow-up for next PM check in the next 1-3 months. She is given a copy of today's PM interrogation.    Same medications for now.

## 2024-04-24 ENCOUNTER — PATIENT OUTREACH (OUTPATIENT)
Dept: HEALTH INFORMATION MANAGEMENT | Facility: OTHER | Age: 64
End: 2024-04-24
Payer: MEDICARE

## 2024-05-01 NOTE — PROGRESS NOTES
CHW has attempted multiple times to reach pt and introduce PCM services. Pt has not answered and multiple vm's have been left for a return call.

## 2024-05-15 ENCOUNTER — TELEPHONE (OUTPATIENT)
Dept: CARDIOLOGY | Facility: MEDICAL CENTER | Age: 64
End: 2024-05-15

## 2024-05-15 NOTE — TELEPHONE ENCOUNTER
Patient has moved to [place] and will be followed there.  Removed from remote monitoring from [facility].

## 2024-06-05 ENCOUNTER — TELEPHONE (OUTPATIENT)
Dept: CARDIOLOGY | Facility: MEDICAL CENTER | Age: 64
End: 2024-06-05

## 2024-06-05 NOTE — TELEPHONE ENCOUNTER
Called back and spoke to  who advised Kimberli was unavailable, she took a message the the patient is not in our system any longer but would be happy to provide device information if needed and direct number provided.

## 2024-06-05 NOTE — TELEPHONE ENCOUNTER
CW    Caller: Kimberli with [facility]    Topic/issue: Requesting to transfer out of our carelink acct through med[employer] to Dr. Grimes with [facility] Since they will be taking over her care.     Callback Number: PH:  [phone]    Thank you    Carmen ECHAVARRIA

## 2024-12-13 DIAGNOSIS — D68.69 HYPERCOAGULABLE STATE DUE TO PERSISTENT ATRIAL FIBRILLATION (HCC): ICD-10-CM

## 2024-12-13 DIAGNOSIS — I48.19 PERSISTENT ATRIAL FIBRILLATION (HCC): Chronic | ICD-10-CM

## 2024-12-13 DIAGNOSIS — I48.19 HYPERCOAGULABLE STATE DUE TO PERSISTENT ATRIAL FIBRILLATION (HCC): ICD-10-CM

## 2024-12-18 DIAGNOSIS — I48.19 HYPERCOAGULABLE STATE DUE TO PERSISTENT ATRIAL FIBRILLATION (HCC): ICD-10-CM

## 2024-12-18 DIAGNOSIS — D68.69 HYPERCOAGULABLE STATE DUE TO PERSISTENT ATRIAL FIBRILLATION (HCC): ICD-10-CM

## 2024-12-18 DIAGNOSIS — I48.19 PERSISTENT ATRIAL FIBRILLATION (HCC): Chronic | ICD-10-CM

## 2025-05-08 DIAGNOSIS — I48.19 HYPERCOAGULABLE STATE DUE TO PERSISTENT ATRIAL FIBRILLATION (HCC): ICD-10-CM

## 2025-05-08 DIAGNOSIS — D68.69 HYPERCOAGULABLE STATE DUE TO PERSISTENT ATRIAL FIBRILLATION (HCC): ICD-10-CM

## 2025-05-08 DIAGNOSIS — I48.19 PERSISTENT ATRIAL FIBRILLATION (HCC): Chronic | ICD-10-CM

## 2025-05-08 NOTE — TELEPHONE ENCOUNTER
Schedulers: Please contact pt to schedule with [facility] or [facility], OPAL 1/23/24. Address on file is in [place], [place].     Thank you!

## 2025-05-09 ENCOUNTER — TELEPHONE (OUTPATIENT)
Dept: CARDIOLOGY | Facility: MEDICAL CENTER | Age: 65
End: 2025-05-09